# Patient Record
Sex: FEMALE | Race: WHITE | Employment: OTHER | URBAN - METROPOLITAN AREA
[De-identification: names, ages, dates, MRNs, and addresses within clinical notes are randomized per-mention and may not be internally consistent; named-entity substitution may affect disease eponyms.]

---

## 2021-06-24 ENCOUNTER — THERAPY VISIT (OUTPATIENT)
Dept: PHYSICAL THERAPY | Facility: CLINIC | Age: 71
End: 2021-06-24
Payer: MEDICARE

## 2021-06-24 DIAGNOSIS — M54.50 LEFT-SIDED LOW BACK PAIN WITHOUT SCIATICA: ICD-10-CM

## 2021-06-24 DIAGNOSIS — M79.18 RIGHT BUTTOCK PAIN: Primary | ICD-10-CM

## 2021-06-24 PROCEDURE — 97161 PT EVAL LOW COMPLEX 20 MIN: CPT | Mod: GP | Performed by: PHYSICAL THERAPIST

## 2021-06-24 PROCEDURE — 97110 THERAPEUTIC EXERCISES: CPT | Mod: GP | Performed by: PHYSICAL THERAPIST

## 2021-06-24 PROCEDURE — 97530 THERAPEUTIC ACTIVITIES: CPT | Mod: GP | Performed by: PHYSICAL THERAPIST

## 2021-06-24 ASSESSMENT — ACTIVITIES OF DAILY LIVING (ADL)
HOS_ADL_COUNT: 17
LIGHT_TO_MODERATE_WORK: NO DIFFICULTY AT ALL
WALKING_UP_STEEP_HILLS: MODERATE DIFFICULTY
STEPPING_UP_AND_DOWN_CURBS: NO DIFFICULTY AT ALL
ROLLING_OVER_IN_BED: NO DIFFICULTY AT ALL
STANDING_FOR_15_MINUTES: NO DIFFICULTY AT ALL
TWISTING/PIVOTING_ON_INVOLVED_LEG: NO DIFFICULTY AT ALL
SITTING_FOR_15_MINUTES: MODERATE DIFFICULTY
GETTING_INTO_AND_OUT_OF_A_BATHTUB: NO DIFFICULTY AT ALL
GETTING_INTO_AND_OUT_OF_AN_AVERAGE_CAR: NO DIFFICULTY AT ALL
HEAVY_WORK: SLIGHT DIFFICULTY
HOW_WOULD_YOU_RATE_YOUR_CURRENT_LEVEL_OF_FUNCTION_DURING_YOUR_USUAL_ACTIVITIES_OF_DAILY_LIVING_FROM_0_TO_100_WITH_100_BEING_YOUR_LEVEL_OF_FUNCTION_PRIOR_TO_YOUR_HIP_PROBLEM_AND_0_BEING_THE_INABILITY_TO_PERFORM_ANY_OF_YOUR_USUAL_DAILY_ACTIVITIES?: 100
GOING_UP_1_FLIGHT_OF_STAIRS: NO DIFFICULTY AT ALL
HOS_ADL_SCORE(%): 85.29
WALKING_INITIALLY: SLIGHT DIFFICULTY
DEEP_SQUATTING: MODERATE DIFFICULTY
WALKING_15_MINUTES_OR_GREATER: NO DIFFICULTY AT ALL
PUTTING_ON_SOCKS_AND_SHOES: SLIGHT DIFFICULTY
WALKING_APPROXIMATELY_10_MINUTES: NO DIFFICULTY AT ALL
GOING_DOWN_1_FLIGHT_OF_STAIRS: NO DIFFICULTY AT ALL
HOS_ADL_ITEM_SCORE_TOTAL: 58
HOS_ADL_HIGHEST_POTENTIAL_SCORE: 68
RECREATIONAL_ACTIVITIES: MODERATE DIFFICULTY
WALKING_DOWN_STEEP_HILLS: MODERATE DIFFICULTY

## 2021-06-24 NOTE — PROGRESS NOTES
Physical Therapy Initial Evaluation  Subjective:  Onset of R hamstring insertion pain over the past year after started hiking more throughout Covid.Referred to PT 03/2021. 5-6 miles of trail hiking at pretty fast pace, usually 2x week. Pain is worse with sitting/driving (especially long distances like to [place]), the next day after hiking, worse with hamstring stretch, stopped yoga b/c was hurting. Also reports some L SI pain ~ 1 year. Denies any B leg pain past buttock. Goal is to get rid of pain, possibly get back to running, hiking for sure, driving long distances.  Pain currently 2/10, was at 5/10 prior to stopping stretching. Has had recent PT but decided to get another opinion.     The history is provided by the patient.   Patient Health History         Pain is reported as 2/10 on pain scale.  General health as reported by patient is excellent.  Pertinent medical history includes: cancer, depression, menopausal and osteoarthritis.      Other medical allergies details: Darvocet.   Surgeries include:  Cancer surgery and other. Other surgery history details: malignant melanoma, intestinal blockage.    Current medications:  Anti-depressants.    Current occupation is retired.                                       Objective:  Standing Alignment:        Lumbar:  Lordosis decr                           Lumbar/SI Evaluation  ROM:    AROM Lumbar:   Flexion:        WNL, pull at R hamstring origin/Isch tube  Ext:                    Min loss   Side Bend:        Left:     Right:   Rotation:           Left:     Right:   Side Glide:        Left:     Right:                   Neural Tension/Mobility:      Left side:SLR; SLR w/DF or Slump  negative.   Right side:   SLR positive.  Right side:   SLR w/DF or Slump  negative.                                             Hip Evaluation  Hip PROM:  Hip PROM:  Left Hip:    Normal  Right Hip:  Normal                          Hip Strength:  : + 90/90 resisted R hamstring test for pain and  weakness. Prone testing strong and painfree B.         Abduction:  Left: 4+/5    -   Pain:Right: 3+/5   -   Pain:                  Hip Special Testing:      Left hip negative for the following special tests:  Piriformis; Louis or Fadir/Labrum   Right hip positive for the following special tests:  PiriformisRight hip negative for the following special tests:  Louis or Fadir/Labrum    Hip Palpation:      Left hip tenderness not present at:  Ischial Tuberosity  Right hip tenderness present at:  Ischial Tuberosity  Functional Testing:        Core Strength:      Single leg bridge:   Left: fair to good control with SL weight shift/20 reps  Right: poor to fair R pelvis control with bridge weight shift. /20 reps  % of Uninvolved:                    General     ROS    Assessment/Plan:    Patient is a 71 year old female with lumbar and R buttock complaints.    Patient has the following significant findings with corresponding treatment plan.                Diagnosis 1:  R hamstring tendinopathy  Pain -  hot/cold therapy, US, manual therapy, splint/taping/bracing/orthotics, self management, education, directional preference exercise and home program  Decreased ROM/flexibility - manual therapy and therapeutic exercise  Decreased strength - therapeutic exercise and therapeutic activities  Decreased proprioception - neuro re-education and therapeutic activities  Inflammation - self management/home program  Impaired gait - gait training  Impaired muscle performance - neuro re-education  Decreased function - therapeutic activities  Impaired posture - neuro re-education  Diagnosis 2:  L SI pain   Pain -  hot/cold therapy, manual therapy, splint/taping/bracing/orthotics, self management, education, directional preference exercise and home program  Decreased ROM/flexibility - manual therapy and therapeutic exercise  Decreased strength - therapeutic exercise and therapeutic activities  Decreased proprioception - neuro re-education and  therapeutic activities  Inflammation - self management/home program  Impaired gait - gait training  Impaired muscle performance - neuro re-education  Decreased function - therapeutic activities  Impaired posture - neuro re-education    Therapy Evaluation Codes:   1) History comprised of:   Personal factors that impact the plan of care:      None.    Comorbidity factors that impact the plan of care are:      Depression.     Medications impacting care: Anti-depressant.  2) Examination of Body Systems comprised of:   Body structures and functions that impact the plan of care:      Hip, Lumbar spine and Sacral illiac joint.   Activity limitations that impact the plan of care are:      Driving, Sitting and Walking.  3) Clinical presentation characteristics are:   Stable/Uncomplicated.  4) Decision-Making    Low complexity using standardized patient assessment instrument and/or measureable assessment of functional outcome.  Cumulative Therapy Evaluation is: Low complexity.    Previous and current functional limitations:  (See Goal Flow Sheet for this information)    Short term and Long term goals: (See Goal Flow Sheet for this information)     Communication ability:  Patient appears to be able to clearly communicate and understand verbal and written communication and follow directions correctly.  Treatment Explanation - The following has been discussed with the patient:   RX ordered/plan of care  Anticipated outcomes  Possible risks and side effects  This patient would benefit from PT intervention to resume normal activities.   Rehab potential is good.    Frequency:  1 X week, once daily  Duration:  for 8 weeks  Discharge Plan:  Achieve all LTG.  Independent in home treatment program.  Reach maximal therapeutic benefit.    Please refer to the daily flowsheet for treatment today, total treatment time and time spent performing 1:1 timed codes.

## 2021-06-24 NOTE — LETTER
DEPARTMENT OF HEALTH AND HUMAN SERVICES  [facility]    PLAN/UPDATED PLAN OF PROGRESS FOR OUTPATIENT REHABILITATION          PATIENTS NAME:  Maria Esther Miller   : 1950  PROVIDER NUMBER:    [id number]  HICN:  [id number]  PROVIDER NAME: [facility]  MEDICAL RECORD NUMBER: [id number]   START OF CARE DATE: 21   TYPE:  PT  PRIMARY/TREATMENT DIAGNOSIS:    Right buttock pain  Left-sided low back pain without sciatica    VISITS FROM START OF CARE:  Rxs Used: 1     Physical Therapy Initial Evaluation  Subjective:  Onset of R hamstring insertion pain over the past year after started hiking more throughout Covid.Referred to PT 2021. 5-6 miles of trail hiking at pretty fast pace, usually 2x week. Pain is worse with sitting/driving (especially long distances like to [place]), the next day after hiking, worse with hamstring stretch, stopped yoga b/c was hurting. Also reports some L SI pain ~ 1 year. Denies any B leg pain past buttock. Goal is to get rid of pain, possibly get back to running, hiking for sure, driving long distances.  Pain currently 2/10, was at 5/10 prior to stopping stretching. Has had recent PT but decided to get another opinion.     The history is provided by the patient.   Patient Health History     Pain is reported as 2/10 on pain scale.  General health as reported by patient is excellent.  Pertinent medical history includes: cancer, depression, menopausal and osteoarthritis.      Other medical allergies details: Darvocet.   Surgeries include:  Cancer surgery and other. Other surgery history details: malignant melanoma, intestinal blockage.    Current medications:  Anti-depressants.    Current occupation is retired.     Objective:  Standing Alignment:        Lumbar:  Lordosis decr            PATIENTS NAME:  Maria Esther Miller   : 1950       Lumbar/SI Evaluation  ROM:    AROM Lumbar:   Flexion:        WNL, pull at R hamstring  origin/Isch tube  Ext:                    Min loss   Side Bend:        Left:     Right:   Rotation:           Left:     Right:   Side Glide:        Left:     Right:         Neural Tension/Mobility:      Left side:SLR; SLR w/DF or Slump  negative.   Right side:   SLR positive.  Right side:   SLR w/DF or Slump  negative.           Hip Evaluation  Hip PROM:  Hip PROM:  Left Hip:    Normal  Right Hip:  Normal      Hip Strength:  : + 90/90 resisted R hamstring test for pain and weakness. Prone testing strong and painfree B.     Abduction:  Left: 4+/5    -   Pain:Right: 3+/5   -   Pain:      Hip Special Testing:      Left hip negative for the following special tests:  Piriformis; Louis or Fadir/Labrum   Right hip positive for the following special tests:  PiriformisRight hip negative for the following special tests:  Louis or Fadir/Labrum    Hip Palpation:      Left hip tenderness not present at:  Ischial Tuberosity  Right hip tenderness present at:  Ischial Tuberosity  Functional Testing:        Core Strength:      Single leg bridge:   Left: fair to good control with SL weight shift/20 reps  Right: poor to fair R pelvis control with bridge weight shift. /20 reps  % of Uninvolved:     Assessment/Plan:    Patient is a 71 year old female with lumbar and R buttock complaints.    Patient has the following significant findings with corresponding treatment plan.                Diagnosis 1:  R hamstring tendinopathy  Pain -  hot/cold therapy, US, manual therapy, splint/taping/bracing/orthotics, self management, education, directional preference exercise and home program  PATIENTS NAME:  Paul Maria Esther   : 1950    Decreased ROM/flexibility - manual therapy and therapeutic exercise  Decreased strength - therapeutic exercise and therapeutic activities  Decreased proprioception - neuro re-education and therapeutic activities  Inflammation - self management/home program  Impaired gait - gait training  Impaired muscle performance  - neuro re-education  Decreased function - therapeutic activities  Impaired posture - neuro re-education  Diagnosis 2:  L SI pain   Pain -  hot/cold therapy, manual therapy, splint/taping/bracing/orthotics, self management, education, directional preference exercise and home program  Decreased ROM/flexibility - manual therapy and therapeutic exercise  Decreased strength - therapeutic exercise and therapeutic activities  Decreased proprioception - neuro re-education and therapeutic activities  Inflammation - self management/home program  Impaired gait - gait training  Impaired muscle performance - neuro re-education  Decreased function - therapeutic activities  Impaired posture - neuro re-education    Therapy Evaluation Codes:   1) History comprised of:   Personal factors that impact the plan of care:      None.    Comorbidity factors that impact the plan of care are:      Depression.     Medications impacting care: Anti-depressant.  2) Examination of Body Systems comprised of:   Body structures and functions that impact the plan of care:      Hip, Lumbar spine and Sacral illiac joint.   Activity limitations that impact the plan of care are:      Driving, Sitting and Walking.  3) Clinical presentation characteristics are:   Stable/Uncomplicated.  4) Decision-Making    Low complexity using standardized patient assessment instrument and/or measureable assessment of functional outcome.  Cumulative Therapy Evaluation is: Low complexity.    Previous and current functional limitations:  (See Goal Flow Sheet for this information)    Short term and Long term goals: (See Goal Flow Sheet for this information)     Communication ability:  Patient appears to be able to clearly communicate and understand verbal and written communication and follow directions correctly.  Treatment Explanation - The following has been discussed with the patient:   RX ordered/plan of care  Anticipated outcomes  Possible risks and side effects  This  "patient would benefit from PT intervention to resume normal activities.   Rehab potential is good.    PATIENTS NAME:  Maria Esther Miller   : 1950    Frequency:  1 X week, once daily  Duration:  for 8 weeks  Discharge Plan:  Achieve all LTG.  Independent in home treatment program.  Reach maximal therapeutic benefit.    Caregiver Signature/Credentials _____________________________ Date ________      Treating Provider: Cherelle Hamilton, PT, OCS     I have reviewed and certified the need for these services and plan of treatment while under my care.        PHYSICIAN'S SIGNATURE:   _____________________________________  Date___________                                 Lincoln Modi MD    Certification period:  Beginning of Cert date period: 21 to  End of Cert period date: 21     Functional Level Progress Report: Please see attached \"Goal Flow sheet for Functional level.\"    ____X____ Continue Services or       ________ DC Services                Service dates: From  SOC Date: 21 date to present                         "

## 2021-07-01 ENCOUNTER — THERAPY VISIT (OUTPATIENT)
Dept: PHYSICAL THERAPY | Facility: CLINIC | Age: 71
End: 2021-07-01
Payer: MEDICARE

## 2021-07-01 DIAGNOSIS — M79.18 RIGHT BUTTOCK PAIN: ICD-10-CM

## 2021-07-01 DIAGNOSIS — M54.50 ACUTE LEFT-SIDED LOW BACK PAIN WITHOUT SCIATICA: ICD-10-CM

## 2021-07-01 PROCEDURE — 97112 NEUROMUSCULAR REEDUCATION: CPT | Mod: GP | Performed by: PHYSICAL THERAPY ASSISTANT

## 2021-07-01 PROCEDURE — 97110 THERAPEUTIC EXERCISES: CPT | Mod: GP | Performed by: PHYSICAL THERAPY ASSISTANT

## 2021-07-01 PROCEDURE — 97140 MANUAL THERAPY 1/> REGIONS: CPT | Mod: GP | Performed by: PHYSICAL THERAPY ASSISTANT

## 2021-07-19 ENCOUNTER — THERAPY VISIT (OUTPATIENT)
Dept: PHYSICAL THERAPY | Facility: CLINIC | Age: 71
End: 2021-07-19
Payer: MEDICARE

## 2021-07-19 DIAGNOSIS — M79.18 RIGHT BUTTOCK PAIN: ICD-10-CM

## 2021-07-19 DIAGNOSIS — M54.50 ACUTE LEFT-SIDED LOW BACK PAIN WITHOUT SCIATICA: ICD-10-CM

## 2021-07-19 PROCEDURE — 97140 MANUAL THERAPY 1/> REGIONS: CPT | Mod: GP | Performed by: PHYSICAL THERAPY ASSISTANT

## 2021-07-19 PROCEDURE — 97110 THERAPEUTIC EXERCISES: CPT | Mod: GP | Performed by: PHYSICAL THERAPY ASSISTANT

## 2021-07-26 ENCOUNTER — THERAPY VISIT (OUTPATIENT)
Dept: PHYSICAL THERAPY | Facility: CLINIC | Age: 71
End: 2021-07-26
Payer: MEDICARE

## 2021-07-26 DIAGNOSIS — M54.50 ACUTE LEFT-SIDED LOW BACK PAIN WITHOUT SCIATICA: ICD-10-CM

## 2021-07-26 DIAGNOSIS — M79.18 RIGHT BUTTOCK PAIN: ICD-10-CM

## 2021-07-26 PROCEDURE — 97110 THERAPEUTIC EXERCISES: CPT | Mod: GP | Performed by: PHYSICAL THERAPY ASSISTANT

## 2021-07-26 PROCEDURE — 97140 MANUAL THERAPY 1/> REGIONS: CPT | Mod: GP | Performed by: PHYSICAL THERAPY ASSISTANT

## 2021-08-09 ENCOUNTER — THERAPY VISIT (OUTPATIENT)
Dept: PHYSICAL THERAPY | Facility: CLINIC | Age: 71
End: 2021-08-09
Payer: MEDICARE

## 2021-08-09 DIAGNOSIS — M54.50 ACUTE LEFT-SIDED LOW BACK PAIN WITHOUT SCIATICA: ICD-10-CM

## 2021-08-09 DIAGNOSIS — M79.18 RIGHT BUTTOCK PAIN: ICD-10-CM

## 2021-08-09 PROCEDURE — 97530 THERAPEUTIC ACTIVITIES: CPT | Mod: GP | Performed by: PHYSICAL THERAPIST

## 2021-08-09 PROCEDURE — 97140 MANUAL THERAPY 1/> REGIONS: CPT | Mod: GP | Performed by: PHYSICAL THERAPIST

## 2021-09-20 ENCOUNTER — THERAPY VISIT (OUTPATIENT)
Dept: PHYSICAL THERAPY | Facility: CLINIC | Age: 71
End: 2021-09-20
Payer: MEDICARE

## 2021-09-20 DIAGNOSIS — M54.50 ACUTE LEFT-SIDED LOW BACK PAIN WITHOUT SCIATICA: ICD-10-CM

## 2021-09-20 DIAGNOSIS — M79.18 RIGHT BUTTOCK PAIN: ICD-10-CM

## 2021-09-20 PROCEDURE — 97530 THERAPEUTIC ACTIVITIES: CPT | Mod: GP | Performed by: PHYSICAL THERAPY ASSISTANT

## 2021-09-20 PROCEDURE — 97110 THERAPEUTIC EXERCISES: CPT | Mod: GP | Performed by: PHYSICAL THERAPY ASSISTANT

## 2021-09-20 ASSESSMENT — ACTIVITIES OF DAILY LIVING (ADL)
HOS_ADL_HIGHEST_POTENTIAL_SCORE: 68
WALKING_UP_STEEP_HILLS: MODERATE DIFFICULTY
GOING_UP_1_FLIGHT_OF_STAIRS: NO DIFFICULTY AT ALL
WALKING_15_MINUTES_OR_GREATER: NO DIFFICULTY AT ALL
HOS_ADL_COUNT: 17
LIGHT_TO_MODERATE_WORK: NO DIFFICULTY AT ALL
STEPPING_UP_AND_DOWN_CURBS: NO DIFFICULTY AT ALL
PUTTING_ON_SOCKS_AND_SHOES: MODERATE DIFFICULTY
SITTING_FOR_15_MINUTES: MODERATE DIFFICULTY
GETTING_INTO_AND_OUT_OF_A_BATHTUB: NO DIFFICULTY AT ALL
RECREATIONAL_ACTIVITIES: SLIGHT DIFFICULTY
HOS_ADL_SCORE(%): 83.82
WALKING_APPROXIMATELY_10_MINUTES: NO DIFFICULTY AT ALL
GETTING_INTO_AND_OUT_OF_AN_AVERAGE_CAR: SLIGHT DIFFICULTY
STANDING_FOR_15_MINUTES: NO DIFFICULTY AT ALL
WALKING_DOWN_STEEP_HILLS: MODERATE DIFFICULTY
WALKING_INITIALLY: MODERATE DIFFICULTY
TWISTING/PIVOTING_ON_INVOLVED_LEG: SLIGHT DIFFICULTY
DEEP_SQUATTING: NO DIFFICULTY AT ALL
HEAVY_WORK: MODERATE DIFFICULTY
HOS_ADL_ITEM_SCORE_TOTAL: 57
GOING_DOWN_1_FLIGHT_OF_STAIRS: NO DIFFICULTY AT ALL
ROLLING_OVER_IN_BED: NO DIFFICULTY AT ALL

## 2021-09-20 NOTE — LETTER
[facility]  [address]  [address]  [place] [address]  [phone]    2021    Re: Maria Esther Miller   :   1950  MRN:  [id number]   REFERRING PHYSICIAN:   Lincoln [facility]MONA [facility]    Date of Initial Evaluation:  2021  Visits:  Rxs Used: 6  Reason for Referral:     Right buttock pain  Acute left-sided low back pain without sciatica    Assessment/Plan:    DISCHARGE REPORT    Progress reporting period is from 2021 to 2021.      SUBJECTIVE  Subjective changes noted by patient:  Patient reported that she drove 275 miles and she felt good.  Patient feels that she is about 90% back to normal.  Patient stated that she has had pain in the right hip for 1 year..  Improved and feels that she can go back to her hiking 3 days a week.     Current pain level is  2/10    Previous pain level was:       Changes in function:  Yes (See Goal flowsheet attached for changes in current functional level)     Adverse reaction to treatment or activity: None     OBJECTIVE  Changes noted in objective findings:  Yes, patient has a good home exercise program and discussed ongoing x3 months.  Discussed also with her her exercise and hiking program and to continue as tolerated.  Discussed with patient possibly not hiking 6-7 times a week but may be 3-4 times a week.  Manual muscle test of the right lower extremity was a 5/5.        Assessment/Plan:    ASSESSMENT/PLAN  Updated problem list and treatment plan:   STG/LTGs have been met:  Yes (See Goal flow sheet completed today.)  Progress toward STG/LTGs have been made:  Yes (See Goal flow sheet completed today.)  Assessment of Progress: The patient's condition is improving.  Self Management Plans:  Patient is independent in a home treatment program.  Patient is independent in self management of symptoms.    Maria Esther continues to require the following intervention to meet  STG and LT's:  PT intervention is no longer required to meet STG/LTG.    Recommendations:  This patient is ready to be discharged from therapy and continue their home treatment program.        Re: Maria Esther Miller   :   1950      Thank you for your referral.    INQUIRIES  Therapist: Cherelle Hamilton, MS, PT, [address] [address]  Phone: [phone]  Fax: [phone]

## 2021-09-24 NOTE — PROGRESS NOTES
Subjective:  HPI  Physical Exam                    Objective:  System    Physical Exam    General     ROS    Assessment/Plan:    DISCHARGE REPORT    Progress reporting period is from 6/24/2021 to 9/20/2021.      SUBJECTIVE  Subjective changes noted by patient:  Patient reported that she drove 275 miles and she felt good.  Patient feels that she is about 90% back to normal.  Patient stated that she has had pain in the right hip for 1 year..  Improved and feels that she can go back to her hiking 3 days a week.     Current pain level is  2/10    Previous pain level was:       Changes in function:  Yes (See Goal flowsheet attached for changes in current functional level)     Adverse reaction to treatment or activity: None     OBJECTIVE  Changes noted in objective findings:  Yes, patient has a good home exercise program and discussed ongoing x3 months.  Discussed also with her her exercise and hiking program and to continue as tolerated.  Discussed with patient possibly not hiking 6-7 times a week but may be 3-4 times a week.  Manual muscle test of the right lower extremity was a 5/5.

## 2021-09-27 PROBLEM — M79.18 RIGHT BUTTOCK PAIN: Status: RESOLVED | Noted: 2021-06-24 | Resolved: 2021-09-27

## 2021-09-27 PROBLEM — M54.50 LEFT-SIDED LOW BACK PAIN WITHOUT SCIATICA: Status: RESOLVED | Noted: 2021-06-24 | Resolved: 2021-09-27

## 2021-09-27 NOTE — PROGRESS NOTES
Subjective:  HPI  Physical Exam                    Objective:  System    Physical Exam    General     ROS    Assessment/Plan:    ASSESSMENT/PLAN  Updated problem list and treatment plan:   STG/LTGs have been met:  Yes (See Goal flow sheet completed today.)  Progress toward STG/LTGs have been made:  Yes (See Goal flow sheet completed today.)  Assessment of Progress: The patient's condition is improving.  Self Management Plans:  Patient is independent in a home treatment program.  Patient is independent in self management of symptoms.    Maria Esther continues to require the following intervention to meet STG and LT's:  PT intervention is no longer required to meet STG/LTG.    Recommendations:  This patient is ready to be discharged from therapy and continue their home treatment program.    Please refer to the daily flowsheet for treatment today, total treatment time and time spent performing 1:1 timed codes.

## 2024-10-24 ENCOUNTER — APPOINTMENT (OUTPATIENT)
Dept: GENERAL RADIOLOGY | Facility: CLINIC | Age: 74
DRG: 336 | End: 2024-10-24
Attending: STUDENT IN AN ORGANIZED HEALTH CARE EDUCATION/TRAINING PROGRAM
Payer: COMMERCIAL

## 2024-10-24 ENCOUNTER — APPOINTMENT (OUTPATIENT)
Dept: CT IMAGING | Facility: CLINIC | Age: 74
DRG: 336 | End: 2024-10-24
Attending: STUDENT IN AN ORGANIZED HEALTH CARE EDUCATION/TRAINING PROGRAM
Payer: COMMERCIAL

## 2024-10-24 ENCOUNTER — HOSPITAL ENCOUNTER (INPATIENT)
Facility: CLINIC | Age: 74
LOS: 9 days | Discharge: HOME | DRG: 336 | End: 2024-11-02
Attending: STUDENT IN AN ORGANIZED HEALTH CARE EDUCATION/TRAINING PROGRAM | Admitting: STUDENT IN AN ORGANIZED HEALTH CARE EDUCATION/TRAINING PROGRAM
Payer: COMMERCIAL

## 2024-10-24 DIAGNOSIS — K56.609 SMALL BOWEL OBSTRUCTION (H): ICD-10-CM

## 2024-10-24 LAB
ALBUMIN SERPL BCG-MCNC: 4.7 G/DL (ref 3.5–5.2)
ALP SERPL-CCNC: 80 U/L (ref 40–150)
ALT SERPL W P-5'-P-CCNC: 27 U/L (ref 0–50)
ANION GAP SERPL CALCULATED.3IONS-SCNC: 18 MMOL/L (ref 7–15)
AST SERPL W P-5'-P-CCNC: 43 U/L (ref 0–45)
B-OH-BUTYR SERPL-SCNC: 0.83 MMOL/L
BASOPHILS # BLD AUTO: 0 10E3/UL (ref 0–0.2)
BASOPHILS NFR BLD AUTO: 0 %
BILIRUB SERPL-MCNC: 0.4 MG/DL
BUN SERPL-MCNC: 14.3 MG/DL (ref 8–23)
CALCIUM SERPL-MCNC: 9.8 MG/DL (ref 8.8–10.4)
CHLORIDE SERPL-SCNC: 99 MMOL/L (ref 98–107)
CREAT SERPL-MCNC: 0.82 MG/DL (ref 0.51–0.95)
EGFRCR SERPLBLD CKD-EPI 2021: 76 ML/MIN/1.73M2
EOSINOPHIL # BLD AUTO: 0 10E3/UL (ref 0–0.7)
EOSINOPHIL NFR BLD AUTO: 0 %
ERYTHROCYTE [DISTWIDTH] IN BLOOD BY AUTOMATED COUNT: 12.5 % (ref 10–15)
GLUCOSE SERPL-MCNC: 120 MG/DL (ref 70–99)
HCO3 SERPL-SCNC: 21 MMOL/L (ref 22–29)
HCT VFR BLD AUTO: 43.6 % (ref 35–47)
HGB BLD-MCNC: 15.2 G/DL (ref 11.7–15.7)
HOLD SPECIMEN: NORMAL
HOLD SPECIMEN: NORMAL
IMM GRANULOCYTES # BLD: 0 10E3/UL
IMM GRANULOCYTES NFR BLD: 0 %
LACTATE SERPL-SCNC: 0.9 MMOL/L (ref 0.7–2)
LIPASE SERPL-CCNC: 26 U/L (ref 13–60)
LYMPHOCYTES # BLD AUTO: 0.9 10E3/UL (ref 0.8–5.3)
LYMPHOCYTES NFR BLD AUTO: 8 %
MCH RBC QN AUTO: 32.3 PG (ref 26.5–33)
MCHC RBC AUTO-ENTMCNC: 34.9 G/DL (ref 31.5–36.5)
MCV RBC AUTO: 93 FL (ref 78–100)
MONOCYTES # BLD AUTO: 0.9 10E3/UL (ref 0–1.3)
MONOCYTES NFR BLD AUTO: 8 %
NEUTROPHILS # BLD AUTO: 9.5 10E3/UL (ref 1.6–8.3)
NEUTROPHILS NFR BLD AUTO: 84 %
NRBC # BLD AUTO: 0 10E3/UL
NRBC BLD AUTO-RTO: 0 /100
PLATELET # BLD AUTO: 298 10E3/UL (ref 150–450)
POTASSIUM SERPL-SCNC: 4.3 MMOL/L (ref 3.4–5.3)
PROT SERPL-MCNC: 7.6 G/DL (ref 6.4–8.3)
RBC # BLD AUTO: 4.71 10E6/UL (ref 3.8–5.2)
SODIUM SERPL-SCNC: 138 MMOL/L (ref 135–145)
WBC # BLD AUTO: 11.4 10E3/UL (ref 4–11)

## 2024-10-24 PROCEDURE — 99223 1ST HOSP IP/OBS HIGH 75: CPT | Performed by: STUDENT IN AN ORGANIZED HEALTH CARE EDUCATION/TRAINING PROGRAM

## 2024-10-24 PROCEDURE — 96365 THER/PROPH/DIAG IV INF INIT: CPT | Mod: 59

## 2024-10-24 PROCEDURE — 255N000002 HC RX 255 OP 636: Performed by: SURGERY

## 2024-10-24 PROCEDURE — 99285 EMERGENCY DEPT VISIT HI MDM: CPT | Mod: 25

## 2024-10-24 PROCEDURE — 80053 COMPREHEN METABOLIC PANEL: CPT | Performed by: STUDENT IN AN ORGANIZED HEALTH CARE EDUCATION/TRAINING PROGRAM

## 2024-10-24 PROCEDURE — 36415 COLL VENOUS BLD VENIPUNCTURE: CPT | Performed by: STUDENT IN AN ORGANIZED HEALTH CARE EDUCATION/TRAINING PROGRAM

## 2024-10-24 PROCEDURE — 258N000003 HC RX IP 258 OP 636: Performed by: STUDENT IN AN ORGANIZED HEALTH CARE EDUCATION/TRAINING PROGRAM

## 2024-10-24 PROCEDURE — 120N000001 HC R&B MED SURG/OB

## 2024-10-24 PROCEDURE — 250N000011 HC RX IP 250 OP 636: Performed by: STUDENT IN AN ORGANIZED HEALTH CARE EDUCATION/TRAINING PROGRAM

## 2024-10-24 PROCEDURE — 83690 ASSAY OF LIPASE: CPT | Performed by: STUDENT IN AN ORGANIZED HEALTH CARE EDUCATION/TRAINING PROGRAM

## 2024-10-24 PROCEDURE — 87040 BLOOD CULTURE FOR BACTERIA: CPT | Performed by: STUDENT IN AN ORGANIZED HEALTH CARE EDUCATION/TRAINING PROGRAM

## 2024-10-24 PROCEDURE — 82010 KETONE BODYS QUAN: CPT | Performed by: STUDENT IN AN ORGANIZED HEALTH CARE EDUCATION/TRAINING PROGRAM

## 2024-10-24 PROCEDURE — 250N000009 HC RX 250: Performed by: STUDENT IN AN ORGANIZED HEALTH CARE EDUCATION/TRAINING PROGRAM

## 2024-10-24 PROCEDURE — 83605 ASSAY OF LACTIC ACID: CPT | Performed by: STUDENT IN AN ORGANIZED HEALTH CARE EDUCATION/TRAINING PROGRAM

## 2024-10-24 PROCEDURE — 999N000065 XR ABDOMEN 1 VIEW

## 2024-10-24 PROCEDURE — 99221 1ST HOSP IP/OBS SF/LOW 40: CPT | Performed by: SURGERY

## 2024-10-24 PROCEDURE — 96375 TX/PRO/DX INJ NEW DRUG ADDON: CPT

## 2024-10-24 PROCEDURE — 250N000009 HC RX 250: Performed by: SURGERY

## 2024-10-24 PROCEDURE — 85025 COMPLETE CBC W/AUTO DIFF WBC: CPT | Performed by: STUDENT IN AN ORGANIZED HEALTH CARE EDUCATION/TRAINING PROGRAM

## 2024-10-24 PROCEDURE — 74177 CT ABD & PELVIS W/CONTRAST: CPT

## 2024-10-24 RX ORDER — IOPAMIDOL 755 MG/ML
500 INJECTION, SOLUTION INTRAVASCULAR ONCE
Status: COMPLETED | OUTPATIENT
Start: 2024-10-24 | End: 2024-10-24

## 2024-10-24 RX ORDER — PROCHLORPERAZINE MALEATE 5 MG/1
5 TABLET ORAL EVERY 6 HOURS PRN
Status: DISCONTINUED | OUTPATIENT
Start: 2024-10-24 | End: 2024-11-02 | Stop reason: HOSPADM

## 2024-10-24 RX ORDER — DEXTROSE MONOHYDRATE, SODIUM CHLORIDE, SODIUM LACTATE, POTASSIUM CHLORIDE, CALCIUM CHLORIDE 5; 600; 310; 179; 20 G/100ML; MG/100ML; MG/100ML; MG/100ML; MG/100ML
INJECTION, SOLUTION INTRAVENOUS CONTINUOUS
Status: DISCONTINUED | OUTPATIENT
Start: 2024-10-24 | End: 2024-10-28

## 2024-10-24 RX ORDER — ONDANSETRON 4 MG/1
4 TABLET, ORALLY DISINTEGRATING ORAL EVERY 6 HOURS PRN
Status: DISCONTINUED | OUTPATIENT
Start: 2024-10-24 | End: 2024-11-02 | Stop reason: HOSPADM

## 2024-10-24 RX ORDER — SODIUM CHLORIDE, SODIUM LACTATE, POTASSIUM CHLORIDE, CALCIUM CHLORIDE 600; 310; 30; 20 MG/100ML; MG/100ML; MG/100ML; MG/100ML
INJECTION, SOLUTION INTRAVENOUS CONTINUOUS
Status: DISCONTINUED | OUTPATIENT
Start: 2024-10-24 | End: 2024-10-24

## 2024-10-24 RX ORDER — FENTANYL CITRATE 50 UG/ML
50 INJECTION, SOLUTION INTRAMUSCULAR; INTRAVENOUS ONCE
Status: COMPLETED | OUTPATIENT
Start: 2024-10-24 | End: 2024-10-24

## 2024-10-24 RX ORDER — LIDOCAINE 40 MG/G
CREAM TOPICAL
Status: DISCONTINUED | OUTPATIENT
Start: 2024-10-24 | End: 2024-11-02 | Stop reason: HOSPADM

## 2024-10-24 RX ORDER — AMOXICILLIN 250 MG
2 CAPSULE ORAL 2 TIMES DAILY PRN
Status: DISCONTINUED | OUTPATIENT
Start: 2024-10-24 | End: 2024-10-28

## 2024-10-24 RX ORDER — ACETAMINOPHEN 325 MG/1
650 TABLET ORAL EVERY 4 HOURS PRN
Status: DISCONTINUED | OUTPATIENT
Start: 2024-10-24 | End: 2024-11-02 | Stop reason: HOSPADM

## 2024-10-24 RX ORDER — LORAZEPAM 0.5 MG/1
0.5 TABLET ORAL 2 TIMES DAILY PRN
Status: DISCONTINUED | OUTPATIENT
Start: 2024-10-24 | End: 2024-11-02 | Stop reason: HOSPADM

## 2024-10-24 RX ORDER — HYDROMORPHONE HCL IN WATER/PF 6 MG/30 ML
0.2 PATIENT CONTROLLED ANALGESIA SYRINGE INTRAVENOUS
Status: DISCONTINUED | OUTPATIENT
Start: 2024-10-24 | End: 2024-11-02 | Stop reason: HOSPADM

## 2024-10-24 RX ORDER — HYDROMORPHONE HCL IN WATER/PF 6 MG/30 ML
0.4 PATIENT CONTROLLED ANALGESIA SYRINGE INTRAVENOUS
Status: DISCONTINUED | OUTPATIENT
Start: 2024-10-24 | End: 2024-11-02 | Stop reason: HOSPADM

## 2024-10-24 RX ORDER — LORAZEPAM 2 MG/ML
0.5 INJECTION INTRAMUSCULAR ONCE
Status: COMPLETED | OUTPATIENT
Start: 2024-10-24 | End: 2024-10-24

## 2024-10-24 RX ORDER — KETOROLAC TROMETHAMINE 15 MG/ML
15 INJECTION, SOLUTION INTRAMUSCULAR; INTRAVENOUS ONCE
Status: COMPLETED | OUTPATIENT
Start: 2024-10-24 | End: 2024-10-24

## 2024-10-24 RX ORDER — AMOXICILLIN 250 MG
1 CAPSULE ORAL 2 TIMES DAILY PRN
Status: DISCONTINUED | OUTPATIENT
Start: 2024-10-24 | End: 2024-10-28

## 2024-10-24 RX ORDER — HYDROMORPHONE HYDROCHLORIDE 1 MG/ML
0.3 INJECTION, SOLUTION INTRAMUSCULAR; INTRAVENOUS; SUBCUTANEOUS ONCE
Status: COMPLETED | OUTPATIENT
Start: 2024-10-24 | End: 2024-10-24

## 2024-10-24 RX ORDER — BUPROPION HYDROCHLORIDE 150 MG/1
150 TABLET, EXTENDED RELEASE ORAL EVERY EVENING
Status: DISCONTINUED | OUTPATIENT
Start: 2024-10-24 | End: 2024-10-25

## 2024-10-24 RX ORDER — PROCHLORPERAZINE 25 MG
12.5 SUPPOSITORY, RECTAL RECTAL EVERY 12 HOURS PRN
Status: DISCONTINUED | OUTPATIENT
Start: 2024-10-24 | End: 2024-11-02 | Stop reason: HOSPADM

## 2024-10-24 RX ORDER — CEFTRIAXONE 2 G/1
2 INJECTION, POWDER, FOR SOLUTION INTRAMUSCULAR; INTRAVENOUS ONCE
Status: COMPLETED | OUTPATIENT
Start: 2024-10-24 | End: 2024-10-24

## 2024-10-24 RX ORDER — ACETAMINOPHEN 650 MG/1
650 SUPPOSITORY RECTAL EVERY 4 HOURS PRN
Status: DISCONTINUED | OUTPATIENT
Start: 2024-10-24 | End: 2024-11-02 | Stop reason: HOSPADM

## 2024-10-24 RX ORDER — LORAZEPAM 0.5 MG/1
0.5 TABLET ORAL AT BEDTIME
Status: DISCONTINUED | OUTPATIENT
Start: 2024-10-24 | End: 2024-11-02 | Stop reason: HOSPADM

## 2024-10-24 RX ORDER — CEFTRIAXONE 2 G/1
2 INJECTION, POWDER, FOR SOLUTION INTRAMUSCULAR; INTRAVENOUS ONCE
Status: COMPLETED | OUTPATIENT
Start: 2024-10-25 | End: 2024-10-25

## 2024-10-24 RX ORDER — GABAPENTIN 300 MG/1
900 CAPSULE ORAL AT BEDTIME
Status: DISCONTINUED | OUTPATIENT
Start: 2024-10-24 | End: 2024-11-02 | Stop reason: HOSPADM

## 2024-10-24 RX ORDER — ONDANSETRON 2 MG/ML
4 INJECTION INTRAMUSCULAR; INTRAVENOUS EVERY 6 HOURS PRN
Status: DISCONTINUED | OUTPATIENT
Start: 2024-10-24 | End: 2024-11-02 | Stop reason: HOSPADM

## 2024-10-24 RX ORDER — CALCIUM CARBONATE 500 MG/1
1000 TABLET, CHEWABLE ORAL 4 TIMES DAILY PRN
Status: DISCONTINUED | OUTPATIENT
Start: 2024-10-24 | End: 2024-11-02 | Stop reason: HOSPADM

## 2024-10-24 RX ORDER — ONDANSETRON 2 MG/ML
4 INJECTION INTRAMUSCULAR; INTRAVENOUS ONCE
Status: COMPLETED | OUTPATIENT
Start: 2024-10-24 | End: 2024-10-24

## 2024-10-24 RX ADMIN — MIDAZOLAM 0.5 MG: 1 INJECTION INTRAMUSCULAR; INTRAVENOUS at 20:07

## 2024-10-24 RX ADMIN — WATER 150 ML: 100 IRRIGANT IRRIGATION at 23:11

## 2024-10-24 RX ADMIN — FENTANYL CITRATE 50 MCG: 0.05 INJECTION, SOLUTION INTRAMUSCULAR; INTRAVENOUS at 16:51

## 2024-10-24 RX ADMIN — ONDANSETRON 4 MG: 2 INJECTION INTRAMUSCULAR; INTRAVENOUS at 19:11

## 2024-10-24 RX ADMIN — SODIUM CHLORIDE 55 ML: 9 INJECTION, SOLUTION INTRAVENOUS at 18:54

## 2024-10-24 RX ADMIN — HYDROMORPHONE HYDROCHLORIDE 0.3 MG: 1 INJECTION, SOLUTION INTRAMUSCULAR; INTRAVENOUS; SUBCUTANEOUS at 18:41

## 2024-10-24 RX ADMIN — IOPAMIDOL 59 ML: 755 INJECTION, SOLUTION INTRAVENOUS at 18:54

## 2024-10-24 RX ADMIN — LORAZEPAM 0.5 MG: 2 INJECTION INTRAMUSCULAR; INTRAVENOUS at 23:10

## 2024-10-24 RX ADMIN — CEFTRIAXONE 2 G: 2 INJECTION, POWDER, FOR SOLUTION INTRAMUSCULAR; INTRAVENOUS at 19:09

## 2024-10-24 RX ADMIN — SODIUM CHLORIDE 500 ML: 9 INJECTION, SOLUTION INTRAVENOUS at 19:09

## 2024-10-24 RX ADMIN — KETOROLAC TROMETHAMINE 15 MG: 15 INJECTION, SOLUTION INTRAMUSCULAR; INTRAVENOUS at 20:08

## 2024-10-24 ASSESSMENT — ACTIVITIES OF DAILY LIVING (ADL)
ADLS_ACUITY_SCORE: 0

## 2024-10-24 ASSESSMENT — COLUMBIA-SUICIDE SEVERITY RATING SCALE - C-SSRS
2. HAVE YOU ACTUALLY HAD ANY THOUGHTS OF KILLING YOURSELF IN THE PAST MONTH?: NO
1. IN THE PAST MONTH, HAVE YOU WISHED YOU WERE DEAD OR WISHED YOU COULD GO TO SLEEP AND NOT WAKE UP?: NO
6. HAVE YOU EVER DONE ANYTHING, STARTED TO DO ANYTHING, OR PREPARED TO DO ANYTHING TO END YOUR LIFE?: NO

## 2024-10-24 NOTE — ED PROVIDER NOTES
"  Emergency Department Note      History of Present Illness     Chief Complaint   Abdominal Pain      HPI   Maria Esther Miller is a 74 year old female with history of recurrent SBO, cecal volvulus, who presents with nausea, vomiting, diffuse abdominal pain, decreased flatulence for the past 24 hours.  Went to urgent care and had a nitrate positive UTI.  Endorses some urinary frequency.  Last SBO was over a decade ago and was felt to be related to adhesions from her hysterectomy.  She did have a bowel perforation when she was found to have cecal volvulus ultimately requiring colostomy though this was reversed.     Independent Historian   None    Review of External Notes   Urgent care note same day.  Nitrite positive UTI.  Abdominal x-ray with distended loops of bowel.    Past Medical History     Medical History and Problem List   No past medical history on file.    Medications   buPROPion (WELLBUTRIN SR) 150 MG 12 hr tablet  gabapentin (NEURONTIN) 300 MG capsule  lorazepam (ATIVAN) 0.5 MG tablet  BIOTIN PO  estradiol (ESTRACE) 1 MG tablet  MULTIPLE VITAMINS PO  Omega-3 Fatty Acids (FISH OIL) 500 MG CAPS  Vitamin E (TOCOPHEROL) 1000 UNIT capsule        Surgical History   Colostomy s/p reversal  Hysterectomy    Physical Exam     Patient Vitals for the past 24 hrs:   BP Temp Temp src Pulse Resp SpO2 Height Weight   10/25/24 0014 -- -- -- -- -- 90 % -- --   10/25/24 0000 (!) 144/72 -- -- 70 -- (!) 87 % -- --   10/24/24 2315 (!) 143/79 -- -- 65 -- 97 % -- --   10/24/24 2045 -- -- -- -- -- 97 % -- --   10/24/24 2015 138/75 -- -- 77 -- 96 % -- --   10/24/24 2000 120/69 -- -- 84 -- 99 % -- --   10/24/24 1900 (!) 116/90 -- -- -- -- -- -- --   10/24/24 1800 130/71 -- -- -- -- -- -- --   10/24/24 1745 -- -- -- -- -- 99 % -- --   10/24/24 1730 -- -- -- 80 -- 98 % -- --   10/24/24 1715 97/52 -- -- 77 -- 99 % -- --   10/24/24 1700 139/77 -- -- 76 -- 96 % -- --   10/24/24 1513 (!) 138/94 98.6  F (37  C) Temporal 87 18 97 % 1.6 m (5' 3\") " 52.7 kg (116 lb 2.9 oz)     Physical Exam  General: Awake, alert, in no acute distress   HEENT: Atraumatic   EOM normal   External ears normal   Trachea midline  Neck: Supple, normal ROM  CV: Regular rate, regular rhythm   No lower extremity edema  2+ radial and DP pulses  PULM: Breath sounds normal bilaterally  No wheezes or rales  ABD: Soft, non-tender, non-distended  Diminished bowel sounds in the left lower and left upper quadrant   No rebound or guarding   MSK: No gross deformities  NEURO: Alert, no focal deficits  Skin: Warm, dry and intact      Diagnostics     Lab Results   Labs Ordered and Resulted from Time of ED Arrival to Time of ED Departure   COMPREHENSIVE METABOLIC PANEL - Abnormal       Result Value    Sodium 138      Potassium 4.3      Carbon Dioxide (CO2) 21 (*)     Anion Gap 18 (*)     Urea Nitrogen 14.3      Creatinine 0.82      GFR Estimate 76      Calcium 9.8      Chloride 99      Glucose 120 (*)     Alkaline Phosphatase 80      AST 43      ALT 27      Protein Total 7.6      Albumin 4.7      Bilirubin Total 0.4     CBC WITH PLATELETS AND DIFFERENTIAL - Abnormal    WBC Count 11.4 (*)     RBC Count 4.71      Hemoglobin 15.2      Hematocrit 43.6      MCV 93      MCH 32.3      MCHC 34.9      RDW 12.5      Platelet Count 298      % Neutrophils 84      % Lymphocytes 8      % Monocytes 8      % Eosinophils 0      % Basophils 0      % Immature Granulocytes 0      NRBCs per 100 WBC 0      Absolute Neutrophils 9.5 (*)     Absolute Lymphocytes 0.9      Absolute Monocytes 0.9      Absolute Eosinophils 0.0      Absolute Basophils 0.0      Absolute Immature Granulocytes 0.0      Absolute NRBCs 0.0     KETONE BETA-HYDROXYBUTYRATE QUANTITATIVE, RAPID - Abnormal    Ketone (Beta-Hydroxybutyrate) Quantitative 0.83 (*)    LIPASE - Normal    Lipase 26     LACTIC ACID WHOLE BLOOD - Normal    Lactic Acid 0.9     MAGNESIUM - Normal    Magnesium 2.1     BLOOD CULTURE       Imaging   Abdomen XR 1 vw   Final Result    IMPRESSION: NG tube tip and sidehole in the stomach. Urinary excretion of IV contrast. Gaseous dilation of a few loops of proximal small bowel, suggesting mechanical small bowel obstruction. Pelvic surgical clips.       CT Abdomen Pelvis w Contrast   Final Result   IMPRESSION:    1.  Acute small bowel obstruction with multiple adjacent transition points in the left midabdomen resulting in a portion which has a closed loop morphology. No evidence of perforation or ischemia.   2.  Circumferential urinary bladder wall thickening suggesting infectious or inflammatory cystitis. Correlate with urinalysis.      Dr. Carey Kim was contacted by me on 10/24/2024 7:25 PM CDT and verbalized understanding of the result.         XR Gastrografin Challenge    (Results Pending)         Independent Interpretation   None    ED Course      Medications Administered   Medications   lidocaine 1 % 0.1-1 mL (has no administration in time range)   lidocaine (LMX4) cream (has no administration in time range)   sodium chloride (PF) 0.9% PF flush 3 mL (has no administration in time range)   sodium chloride (PF) 0.9% PF flush 3 mL (has no administration in time range)   senna-docusate (SENOKOT-S/PERICOLACE) 8.6-50 MG per tablet 1 tablet (has no administration in time range)     Or   senna-docusate (SENOKOT-S/PERICOLACE) 8.6-50 MG per tablet 2 tablet (has no administration in time range)   calcium carbonate (TUMS) chewable tablet 1,000 mg (has no administration in time range)   acetaminophen (TYLENOL) tablet 650 mg (has no administration in time range)     Or   acetaminophen (TYLENOL) Suppository 650 mg (has no administration in time range)   HYDROmorphone (DILAUDID) injection 0.2 mg (has no administration in time range)   HYDROmorphone (DILAUDID) injection 0.4 mg (0.4 mg Intravenous $Given 10/25/24 0009)   ondansetron (ZOFRAN ODT) ODT tab 4 mg (has no administration in time range)     Or   ondansetron (ZOFRAN) injection 4 mg (has no  administration in time range)   prochlorperazine (COMPAZINE) injection 5 mg (has no administration in time range)     Or   prochlorperazine (COMPAZINE) tablet 5 mg (has no administration in time range)     Or   prochlorperazine (COMPAZINE) suppository 12.5 mg (has no administration in time range)   cefTRIAXone (ROCEPHIN) 2 g vial to attach to  ml bag for ADULTS or NS 50 ml bag for PEDS (has no administration in time range)   dextrose 5% in lactated ringers + KCl 20 mEq/L infusion (has no administration in time range)   buPROPion (WELLBUTRIN SR) 12 hr tablet 150 mg (has no administration in time range)   gabapentin (NEURONTIN) capsule 900 mg (has no administration in time range)   LORazepam (ATIVAN) tablet 0.5 mg (has no administration in time range)   LORazepam (ATIVAN) tablet 0.5 mg (has no administration in time range)   fentaNYL (PF) (SUBLIMAZE) injection 50 mcg (50 mcg Intravenous $Given 10/24/24 1651)   ondansetron (ZOFRAN) injection 4 mg (4 mg Intravenous $Given 10/24/24 1911)   sodium chloride 0.9% BOLUS 500 mL (0 mLs Intravenous Stopped 10/24/24 2038)   cefTRIAXone (ROCEPHIN) 2 g vial to attach to  ml bag for ADULTS or NS 50 ml bag for PEDS (0 g Intravenous Stopped 10/24/24 2008)   HYDROmorphone (PF) (DILAUDID) injection 0.3 mg (0.3 mg Intravenous $Given 10/24/24 1841)   CT scan flush (55 mLs Intravenous $Given 10/24/24 1854)   iopamidol (ISOVUE-370) solution 500 mL (59 mLs Intravenous $Given 10/24/24 1854)   ketorolac (TORADOL) injection 15 mg (15 mg Intravenous $Given 10/24/24 2008)   midazolam (VERSED) injection 0.5 mg (0.5 mg Intravenous $Given 10/24/24 2007)   diatrizoate meglumine-sodium (GASTROGRAFIN) 120 mL in sterile water (bottle) 150 mL (150 mLs Nasogastric $Given 10/24/24 2311)   LORazepam (ATIVAN) injection 0.5 mg (0.5 mg Intravenous $Given 10/24/24 1529)       Procedures   Procedures     Discussion of Management   Admitting Hospitalist, Dr. Alonzo  Surgery, Dr. Sanchez    ED Course    ED Course as of 10/25/24 0040   Thu Oct 24, 2024   1925 Spoke with radiology   1941 edmund   1950 Spoke with Dr. Sanchez general surgery.   1956 Spoke with Dr. Alonzo hospitalist who accepts admission       Additional Documentation  None    Medical Decision Making / Diagnosis     CMS Diagnoses: None    MIPS       None    MDM   Maria Esther Miller is a 74 year old female who presents to the ED due to abdominal pain with concern for possible SBO.  History of SBO and cecal volvulus, hysterectomy, colectomy s/p reversal.  Symptoms are concerning for recurrent SBO given decreased flatulence, nausea, vomiting.  Will keep NPO.     Labs are reassuring except for a slight leukocytosis which I think is likely reactive from her vomiting.  Lactic is WNL.  Did have nitrites in her urine at urgent care so we will obtain a blood culture and start Rocephin.    CT scan of the abdomen and pelvis shows small bowel obstruction with 2 transition points and a relatively small loop of bowel in between the 2 transition points.  There is no signs of ischemia radiographically and her exam is reassuring with minimal reproducible abdominal pain, labs with a normal lactic.     Due to complicated SBO with multiple transition points, involved general surgery early on.  They kindly agreed to see the patient, agree with NG tube and strict NPO.  Will start on maintenance fluids.  Patient understands all findings, need for NG tube for decompression, plan for admission to hospital.  Admitted to Dr. Alonzo who kindly accepts.    Disposition   The patient was admitted to the hospital.     Diagnosis     ICD-10-CM    1. Small bowel obstruction (H)  K56.609                  DO Judy Goldstein Ellen, DO  10/25/24 0040

## 2024-10-24 NOTE — ED TRIAGE NOTES
Presents to ED from  for abdominal pain X 24 hours. Pt vomited several times throughout the night. Xray shows SBO and UA shows UTI. Last SBO was in 2010. ABCs intact. A&OX4.      Triage Assessment (Adult)       Row Name 10/24/24 1512          Triage Assessment    Airway WDL WDL        Respiratory WDL    Respiratory WDL WDL        Skin Circulation/Temperature WDL    Skin Circulation/Temperature WDL WDL        Cardiac WDL    Cardiac WDL WDL        Peripheral/Neurovascular WDL    Peripheral Neurovascular WDL WDL        Cognitive/Neuro/Behavioral WDL    Cognitive/Neuro/Behavioral WDL WDL

## 2024-10-25 ENCOUNTER — ANESTHESIA (OUTPATIENT)
Dept: SURGERY | Facility: CLINIC | Age: 74
DRG: 336 | End: 2024-10-25
Payer: COMMERCIAL

## 2024-10-25 ENCOUNTER — APPOINTMENT (OUTPATIENT)
Dept: SURGERY | Facility: PHYSICIAN GROUP | Age: 74
End: 2024-10-25
Payer: COMMERCIAL

## 2024-10-25 ENCOUNTER — ANESTHESIA EVENT (OUTPATIENT)
Dept: SURGERY | Facility: CLINIC | Age: 74
DRG: 336 | End: 2024-10-25
Payer: COMMERCIAL

## 2024-10-25 ENCOUNTER — APPOINTMENT (OUTPATIENT)
Dept: GENERAL RADIOLOGY | Facility: CLINIC | Age: 74
DRG: 336 | End: 2024-10-25
Attending: SURGERY
Payer: COMMERCIAL

## 2024-10-25 LAB
ANION GAP SERPL CALCULATED.3IONS-SCNC: 12 MMOL/L (ref 7–15)
BUN SERPL-MCNC: 18.2 MG/DL (ref 8–23)
CALCIUM SERPL-MCNC: 9.5 MG/DL (ref 8.8–10.4)
CHLORIDE SERPL-SCNC: 104 MMOL/L (ref 98–107)
CREAT SERPL-MCNC: 0.88 MG/DL (ref 0.51–0.95)
EGFRCR SERPLBLD CKD-EPI 2021: 69 ML/MIN/1.73M2
ERYTHROCYTE [DISTWIDTH] IN BLOOD BY AUTOMATED COUNT: 12.9 % (ref 10–15)
GLUCOSE BLDC GLUCOMTR-MCNC: 138 MG/DL (ref 70–99)
GLUCOSE SERPL-MCNC: 155 MG/DL (ref 70–99)
HCO3 SERPL-SCNC: 25 MMOL/L (ref 22–29)
HCT VFR BLD AUTO: 42.5 % (ref 35–47)
HGB BLD-MCNC: 14.3 G/DL (ref 11.7–15.7)
MAGNESIUM SERPL-MCNC: 2.1 MG/DL (ref 1.7–2.3)
MAGNESIUM SERPL-MCNC: 2.2 MG/DL (ref 1.7–2.3)
MCH RBC QN AUTO: 31.5 PG (ref 26.5–33)
MCHC RBC AUTO-ENTMCNC: 33.6 G/DL (ref 31.5–36.5)
MCV RBC AUTO: 94 FL (ref 78–100)
PLATELET # BLD AUTO: 271 10E3/UL (ref 150–450)
POTASSIUM SERPL-SCNC: 3.9 MMOL/L (ref 3.4–5.3)
RBC # BLD AUTO: 4.54 10E6/UL (ref 3.8–5.2)
SODIUM SERPL-SCNC: 141 MMOL/L (ref 135–145)
WBC # BLD AUTO: 13.9 10E3/UL (ref 4–11)

## 2024-10-25 PROCEDURE — 710N000009 HC RECOVERY PHASE 1, LEVEL 1, PER MIN: Performed by: STUDENT IN AN ORGANIZED HEALTH CARE EDUCATION/TRAINING PROGRAM

## 2024-10-25 PROCEDURE — 0DN80ZZ RELEASE SMALL INTESTINE, OPEN APPROACH: ICD-10-PCS | Performed by: STUDENT IN AN ORGANIZED HEALTH CARE EDUCATION/TRAINING PROGRAM

## 2024-10-25 PROCEDURE — 74018 RADEX ABDOMEN 1 VIEW: CPT

## 2024-10-25 PROCEDURE — 370N000017 HC ANESTHESIA TECHNICAL FEE, PER MIN: Performed by: STUDENT IN AN ORGANIZED HEALTH CARE EDUCATION/TRAINING PROGRAM

## 2024-10-25 PROCEDURE — 99232 SBSQ HOSP IP/OBS MODERATE 35: CPT | Performed by: PHYSICIAN ASSISTANT

## 2024-10-25 PROCEDURE — 250N000011 HC RX IP 250 OP 636: Performed by: NURSE ANESTHETIST, CERTIFIED REGISTERED

## 2024-10-25 PROCEDURE — 250N000013 HC RX MED GY IP 250 OP 250 PS 637: Performed by: PHYSICIAN ASSISTANT

## 2024-10-25 PROCEDURE — 272N000001 HC OR GENERAL SUPPLY STERILE: Performed by: STUDENT IN AN ORGANIZED HEALTH CARE EDUCATION/TRAINING PROGRAM

## 2024-10-25 PROCEDURE — 83735 ASSAY OF MAGNESIUM: CPT | Performed by: STUDENT IN AN ORGANIZED HEALTH CARE EDUCATION/TRAINING PROGRAM

## 2024-10-25 PROCEDURE — 999N000141 HC STATISTIC PRE-PROCEDURE NURSING ASSESSMENT: Performed by: STUDENT IN AN ORGANIZED HEALTH CARE EDUCATION/TRAINING PROGRAM

## 2024-10-25 PROCEDURE — 360N000076 HC SURGERY LEVEL 3, PER MIN: Performed by: STUDENT IN AN ORGANIZED HEALTH CARE EDUCATION/TRAINING PROGRAM

## 2024-10-25 PROCEDURE — 258N000001 HC RX 258: Performed by: STUDENT IN AN ORGANIZED HEALTH CARE EDUCATION/TRAINING PROGRAM

## 2024-10-25 PROCEDURE — 44005 FREEING OF BOWEL ADHESION: CPT | Mod: AS | Performed by: PHYSICIAN ASSISTANT

## 2024-10-25 PROCEDURE — 250N000009 HC RX 250: Performed by: NURSE ANESTHETIST, CERTIFIED REGISTERED

## 2024-10-25 PROCEDURE — 258N000001 HC RX 258: Performed by: PHYSICIAN ASSISTANT

## 2024-10-25 PROCEDURE — 250N000011 HC RX IP 250 OP 636: Performed by: STUDENT IN AN ORGANIZED HEALTH CARE EDUCATION/TRAINING PROGRAM

## 2024-10-25 PROCEDURE — 120N000001 HC R&B MED SURG/OB

## 2024-10-25 PROCEDURE — 85014 HEMATOCRIT: CPT | Performed by: STUDENT IN AN ORGANIZED HEALTH CARE EDUCATION/TRAINING PROGRAM

## 2024-10-25 PROCEDURE — 258N000003 HC RX IP 258 OP 636: Performed by: NURSE ANESTHETIST, CERTIFIED REGISTERED

## 2024-10-25 PROCEDURE — 0DNU0ZZ RELEASE OMENTUM, OPEN APPROACH: ICD-10-PCS | Performed by: STUDENT IN AN ORGANIZED HEALTH CARE EDUCATION/TRAINING PROGRAM

## 2024-10-25 PROCEDURE — 0DNW4ZZ RELEASE PERITONEUM, PERCUTANEOUS ENDOSCOPIC APPROACH: ICD-10-PCS | Performed by: STUDENT IN AN ORGANIZED HEALTH CARE EDUCATION/TRAINING PROGRAM

## 2024-10-25 PROCEDURE — 250N000009 HC RX 250: Performed by: STUDENT IN AN ORGANIZED HEALTH CARE EDUCATION/TRAINING PROGRAM

## 2024-10-25 PROCEDURE — 250N000025 HC SEVOFLURANE, PER MIN: Performed by: STUDENT IN AN ORGANIZED HEALTH CARE EDUCATION/TRAINING PROGRAM

## 2024-10-25 PROCEDURE — 44005 FREEING OF BOWEL ADHESION: CPT | Performed by: STUDENT IN AN ORGANIZED HEALTH CARE EDUCATION/TRAINING PROGRAM

## 2024-10-25 PROCEDURE — 250N000013 HC RX MED GY IP 250 OP 250 PS 637: Performed by: STUDENT IN AN ORGANIZED HEALTH CARE EDUCATION/TRAINING PROGRAM

## 2024-10-25 PROCEDURE — 250N000011 HC RX IP 250 OP 636: Performed by: ANESTHESIOLOGY

## 2024-10-25 PROCEDURE — 36415 COLL VENOUS BLD VENIPUNCTURE: CPT | Performed by: STUDENT IN AN ORGANIZED HEALTH CARE EDUCATION/TRAINING PROGRAM

## 2024-10-25 PROCEDURE — 80048 BASIC METABOLIC PNL TOTAL CA: CPT | Performed by: STUDENT IN AN ORGANIZED HEALTH CARE EDUCATION/TRAINING PROGRAM

## 2024-10-25 RX ORDER — DEXAMETHASONE SODIUM PHOSPHATE 4 MG/ML
INJECTION, SOLUTION INTRA-ARTICULAR; INTRALESIONAL; INTRAMUSCULAR; INTRAVENOUS; SOFT TISSUE PRN
Status: DISCONTINUED | OUTPATIENT
Start: 2024-10-25 | End: 2024-10-25

## 2024-10-25 RX ORDER — LIDOCAINE 40 MG/G
CREAM TOPICAL
Status: DISCONTINUED | OUTPATIENT
Start: 2024-10-25 | End: 2024-10-25 | Stop reason: HOSPADM

## 2024-10-25 RX ORDER — BUPIVACAINE HYDROCHLORIDE AND EPINEPHRINE 5; 5 MG/ML; UG/ML
INJECTION, SOLUTION EPIDURAL; INTRACAUDAL; PERINEURAL PRN
Status: DISCONTINUED | OUTPATIENT
Start: 2024-10-25 | End: 2024-10-25 | Stop reason: HOSPADM

## 2024-10-25 RX ORDER — ONDANSETRON 4 MG/1
4 TABLET, ORALLY DISINTEGRATING ORAL EVERY 30 MIN PRN
Status: DISCONTINUED | OUTPATIENT
Start: 2024-10-25 | End: 2024-10-25 | Stop reason: HOSPADM

## 2024-10-25 RX ORDER — SERTRALINE HYDROCHLORIDE 100 MG/1
100 TABLET, FILM COATED ORAL DAILY
COMMUNITY

## 2024-10-25 RX ORDER — DIAZEPAM 10 MG/2ML
2.5 INJECTION, SOLUTION INTRAMUSCULAR; INTRAVENOUS
Status: DISCONTINUED | OUTPATIENT
Start: 2024-10-25 | End: 2024-10-25 | Stop reason: HOSPADM

## 2024-10-25 RX ORDER — ONDANSETRON 2 MG/ML
4 INJECTION INTRAMUSCULAR; INTRAVENOUS EVERY 30 MIN PRN
Status: DISCONTINUED | OUTPATIENT
Start: 2024-10-25 | End: 2024-10-25 | Stop reason: HOSPADM

## 2024-10-25 RX ORDER — EPHEDRINE SULFATE 50 MG/ML
INJECTION, SOLUTION INTRAMUSCULAR; INTRAVENOUS; SUBCUTANEOUS PRN
Status: DISCONTINUED | OUTPATIENT
Start: 2024-10-25 | End: 2024-10-25

## 2024-10-25 RX ORDER — ACETAMINOPHEN 325 MG/1
975 TABLET ORAL ONCE
Status: DISCONTINUED | OUTPATIENT
Start: 2024-10-25 | End: 2024-10-25 | Stop reason: HOSPADM

## 2024-10-25 RX ORDER — HYDROMORPHONE HCL IN WATER/PF 6 MG/30 ML
0.2 PATIENT CONTROLLED ANALGESIA SYRINGE INTRAVENOUS EVERY 5 MIN PRN
Status: DISCONTINUED | OUTPATIENT
Start: 2024-10-25 | End: 2024-10-25 | Stop reason: HOSPADM

## 2024-10-25 RX ORDER — PROPOFOL 10 MG/ML
INJECTION, EMULSION INTRAVENOUS PRN
Status: DISCONTINUED | OUTPATIENT
Start: 2024-10-25 | End: 2024-10-25

## 2024-10-25 RX ORDER — FENTANYL CITRATE 50 UG/ML
INJECTION, SOLUTION INTRAMUSCULAR; INTRAVENOUS PRN
Status: DISCONTINUED | OUTPATIENT
Start: 2024-10-25 | End: 2024-10-25

## 2024-10-25 RX ORDER — LIDOCAINE HYDROCHLORIDE 20 MG/ML
INJECTION, SOLUTION INFILTRATION; PERINEURAL PRN
Status: DISCONTINUED | OUTPATIENT
Start: 2024-10-25 | End: 2024-10-25

## 2024-10-25 RX ORDER — DEXAMETHASONE SODIUM PHOSPHATE 4 MG/ML
4 INJECTION, SOLUTION INTRA-ARTICULAR; INTRALESIONAL; INTRAMUSCULAR; INTRAVENOUS; SOFT TISSUE
Status: DISCONTINUED | OUTPATIENT
Start: 2024-10-25 | End: 2024-10-25 | Stop reason: HOSPADM

## 2024-10-25 RX ORDER — BUPROPION HYDROCHLORIDE 150 MG/1
300 TABLET, EXTENDED RELEASE ORAL EVERY MORNING
Status: DISCONTINUED | OUTPATIENT
Start: 2024-10-26 | End: 2024-11-02 | Stop reason: HOSPADM

## 2024-10-25 RX ORDER — FENTANYL CITRATE 50 UG/ML
25 INJECTION, SOLUTION INTRAMUSCULAR; INTRAVENOUS EVERY 5 MIN PRN
Status: DISCONTINUED | OUTPATIENT
Start: 2024-10-25 | End: 2024-10-25 | Stop reason: HOSPADM

## 2024-10-25 RX ORDER — FENTANYL CITRATE 50 UG/ML
50 INJECTION, SOLUTION INTRAMUSCULAR; INTRAVENOUS EVERY 5 MIN PRN
Status: DISCONTINUED | OUTPATIENT
Start: 2024-10-25 | End: 2024-10-25 | Stop reason: HOSPADM

## 2024-10-25 RX ORDER — NALOXONE HYDROCHLORIDE 0.4 MG/ML
0.2 INJECTION, SOLUTION INTRAMUSCULAR; INTRAVENOUS; SUBCUTANEOUS
Status: DISCONTINUED | OUTPATIENT
Start: 2024-10-25 | End: 2024-11-02 | Stop reason: HOSPADM

## 2024-10-25 RX ORDER — ATORVASTATIN CALCIUM 20 MG/1
20 TABLET, FILM COATED ORAL EVERY EVENING
Status: DISCONTINUED | OUTPATIENT
Start: 2024-10-25 | End: 2024-11-02 | Stop reason: HOSPADM

## 2024-10-25 RX ORDER — SODIUM CHLORIDE, SODIUM LACTATE, POTASSIUM CHLORIDE, CALCIUM CHLORIDE 600; 310; 30; 20 MG/100ML; MG/100ML; MG/100ML; MG/100ML
INJECTION, SOLUTION INTRAVENOUS CONTINUOUS PRN
Status: DISCONTINUED | OUTPATIENT
Start: 2024-10-25 | End: 2024-10-25

## 2024-10-25 RX ORDER — ALBUTEROL SULFATE 0.83 MG/ML
2.5 SOLUTION RESPIRATORY (INHALATION) EVERY 4 HOURS PRN
Status: DISCONTINUED | OUTPATIENT
Start: 2024-10-25 | End: 2024-10-25 | Stop reason: HOSPADM

## 2024-10-25 RX ORDER — BUPROPION HYDROCHLORIDE 150 MG/1
150 TABLET, EXTENDED RELEASE ORAL EVERY EVENING
Status: DISCONTINUED | OUTPATIENT
Start: 2024-10-25 | End: 2024-11-02 | Stop reason: HOSPADM

## 2024-10-25 RX ORDER — MAGNESIUM HYDROXIDE 1200 MG/15ML
LIQUID ORAL PRN
Status: DISCONTINUED | OUTPATIENT
Start: 2024-10-25 | End: 2024-10-25 | Stop reason: HOSPADM

## 2024-10-25 RX ORDER — ONDANSETRON 2 MG/ML
INJECTION INTRAMUSCULAR; INTRAVENOUS PRN
Status: DISCONTINUED | OUTPATIENT
Start: 2024-10-25 | End: 2024-10-25

## 2024-10-25 RX ORDER — BUPROPION HYDROCHLORIDE 150 MG/1
300 TABLET, EXTENDED RELEASE ORAL EVERY MORNING
COMMUNITY

## 2024-10-25 RX ORDER — LORAZEPAM 0.5 MG/1
1 TABLET ORAL AT BEDTIME
COMMUNITY

## 2024-10-25 RX ORDER — NALOXONE HYDROCHLORIDE 0.4 MG/ML
0.4 INJECTION, SOLUTION INTRAMUSCULAR; INTRAVENOUS; SUBCUTANEOUS
Status: DISCONTINUED | OUTPATIENT
Start: 2024-10-25 | End: 2024-11-02 | Stop reason: HOSPADM

## 2024-10-25 RX ORDER — HYDROMORPHONE HCL IN WATER/PF 6 MG/30 ML
0.4 PATIENT CONTROLLED ANALGESIA SYRINGE INTRAVENOUS EVERY 5 MIN PRN
Status: DISCONTINUED | OUTPATIENT
Start: 2024-10-25 | End: 2024-10-25 | Stop reason: HOSPADM

## 2024-10-25 RX ORDER — DIPHENHYDRAMINE HCL 12.5MG/5ML
12.5 LIQUID (ML) ORAL EVERY 6 HOURS PRN
Status: DISCONTINUED | OUTPATIENT
Start: 2024-10-25 | End: 2024-10-25 | Stop reason: HOSPADM

## 2024-10-25 RX ORDER — LABETALOL HYDROCHLORIDE 5 MG/ML
10 INJECTION, SOLUTION INTRAVENOUS
Status: DISCONTINUED | OUTPATIENT
Start: 2024-10-25 | End: 2024-10-25 | Stop reason: HOSPADM

## 2024-10-25 RX ORDER — DIPHENHYDRAMINE HYDROCHLORIDE 50 MG/ML
12.5 INJECTION INTRAMUSCULAR; INTRAVENOUS EVERY 6 HOURS PRN
Status: DISCONTINUED | OUTPATIENT
Start: 2024-10-25 | End: 2024-10-25 | Stop reason: HOSPADM

## 2024-10-25 RX ORDER — TRETINOIN 1 MG/G
CREAM TOPICAL AT BEDTIME
COMMUNITY

## 2024-10-25 RX ORDER — SERTRALINE HYDROCHLORIDE 100 MG/1
100 TABLET, FILM COATED ORAL DAILY
Status: DISCONTINUED | OUTPATIENT
Start: 2024-10-26 | End: 2024-11-02 | Stop reason: HOSPADM

## 2024-10-25 RX ORDER — CEFAZOLIN SODIUM/WATER 2 G/20 ML
2 SYRINGE (ML) INTRAVENOUS
Status: COMPLETED | OUTPATIENT
Start: 2024-10-25 | End: 2024-10-25

## 2024-10-25 RX ORDER — ATORVASTATIN CALCIUM 20 MG/1
20 TABLET, FILM COATED ORAL EVERY EVENING
COMMUNITY

## 2024-10-25 RX ORDER — HYDRALAZINE HYDROCHLORIDE 20 MG/ML
2.5-5 INJECTION INTRAMUSCULAR; INTRAVENOUS EVERY 10 MIN PRN
Status: DISCONTINUED | OUTPATIENT
Start: 2024-10-25 | End: 2024-10-25 | Stop reason: HOSPADM

## 2024-10-25 RX ORDER — NALOXONE HYDROCHLORIDE 0.4 MG/ML
0.1 INJECTION, SOLUTION INTRAMUSCULAR; INTRAVENOUS; SUBCUTANEOUS
Status: DISCONTINUED | OUTPATIENT
Start: 2024-10-25 | End: 2024-10-25 | Stop reason: HOSPADM

## 2024-10-25 RX ORDER — GLYCOPYRROLATE 0.2 MG/ML
INJECTION, SOLUTION INTRAMUSCULAR; INTRAVENOUS PRN
Status: DISCONTINUED | OUTPATIENT
Start: 2024-10-25 | End: 2024-10-25

## 2024-10-25 RX ORDER — KETOROLAC TROMETHAMINE 30 MG/ML
INJECTION, SOLUTION INTRAMUSCULAR; INTRAVENOUS PRN
Status: DISCONTINUED | OUTPATIENT
Start: 2024-10-25 | End: 2024-10-25

## 2024-10-25 RX ORDER — SODIUM CHLORIDE, SODIUM LACTATE, POTASSIUM CHLORIDE, CALCIUM CHLORIDE 600; 310; 30; 20 MG/100ML; MG/100ML; MG/100ML; MG/100ML
INJECTION, SOLUTION INTRAVENOUS CONTINUOUS
Status: DISCONTINUED | OUTPATIENT
Start: 2024-10-25 | End: 2024-10-25 | Stop reason: HOSPADM

## 2024-10-25 RX ORDER — CEFAZOLIN SODIUM/WATER 2 G/20 ML
2 SYRINGE (ML) INTRAVENOUS SEE ADMIN INSTRUCTIONS
Status: DISCONTINUED | OUTPATIENT
Start: 2024-10-25 | End: 2024-10-25 | Stop reason: HOSPADM

## 2024-10-25 RX ORDER — BUPROPION HYDROCHLORIDE 150 MG/1
150 TABLET, EXTENDED RELEASE ORAL EVERY EVENING
COMMUNITY

## 2024-10-25 RX ADMIN — EPHEDRINE SULFATE 10 MG: 5 INJECTION INTRAVENOUS at 10:40

## 2024-10-25 RX ADMIN — ONDANSETRON 4 MG: 2 INJECTION INTRAMUSCULAR; INTRAVENOUS at 10:16

## 2024-10-25 RX ADMIN — DEXTROSE MONOHYDRATE, SODIUM CHLORIDE, SODIUM LACTATE, POTASSIUM CHLORIDE, CALCIUM CHLORIDE: 5; 600; 310; 179; 20 INJECTION, SOLUTION INTRAVENOUS at 16:59

## 2024-10-25 RX ADMIN — HYDROMORPHONE HYDROCHLORIDE 0.2 MG: 0.2 INJECTION, SOLUTION INTRAMUSCULAR; INTRAVENOUS; SUBCUTANEOUS at 13:16

## 2024-10-25 RX ADMIN — ROCURONIUM BROMIDE 5 MG: 50 INJECTION, SOLUTION INTRAVENOUS at 10:16

## 2024-10-25 RX ADMIN — FENTANYL CITRATE 25 MCG: 0.05 INJECTION, SOLUTION INTRAMUSCULAR; INTRAVENOUS at 12:51

## 2024-10-25 RX ADMIN — BUPROPION HYDROCHLORIDE 150 MG: 150 TABLET, FILM COATED, EXTENDED RELEASE ORAL at 19:02

## 2024-10-25 RX ADMIN — PHENYLEPHRINE HYDROCHLORIDE 100 MCG: 10 INJECTION INTRAVENOUS at 11:15

## 2024-10-25 RX ADMIN — HYDROMORPHONE HYDROCHLORIDE 0.4 MG: 0.2 INJECTION, SOLUTION INTRAMUSCULAR; INTRAVENOUS; SUBCUTANEOUS at 03:36

## 2024-10-25 RX ADMIN — HYDROMORPHONE HYDROCHLORIDE 0.2 MG: 0.2 INJECTION, SOLUTION INTRAMUSCULAR; INTRAVENOUS; SUBCUTANEOUS at 18:00

## 2024-10-25 RX ADMIN — SODIUM CHLORIDE, POTASSIUM CHLORIDE, SODIUM LACTATE AND CALCIUM CHLORIDE: 600; 310; 30; 20 INJECTION, SOLUTION INTRAVENOUS at 11:57

## 2024-10-25 RX ADMIN — HYDROMORPHONE HYDROCHLORIDE 0.4 MG: 0.2 INJECTION, SOLUTION INTRAMUSCULAR; INTRAVENOUS; SUBCUTANEOUS at 08:44

## 2024-10-25 RX ADMIN — SODIUM CHLORIDE, POTASSIUM CHLORIDE, SODIUM LACTATE AND CALCIUM CHLORIDE: 600; 310; 30; 20 INJECTION, SOLUTION INTRAVENOUS at 10:13

## 2024-10-25 RX ADMIN — DEXAMETHASONE SODIUM PHOSPHATE 8 MG: 4 INJECTION, SOLUTION INTRA-ARTICULAR; INTRALESIONAL; INTRAMUSCULAR; INTRAVENOUS; SOFT TISSUE at 10:16

## 2024-10-25 RX ADMIN — FENTANYL CITRATE 100 MCG: 50 INJECTION INTRAMUSCULAR; INTRAVENOUS at 10:16

## 2024-10-25 RX ADMIN — FENTANYL CITRATE 25 MCG: 0.05 INJECTION, SOLUTION INTRAMUSCULAR; INTRAVENOUS at 13:11

## 2024-10-25 RX ADMIN — SUGAMMADEX 200 MG: 100 INJECTION, SOLUTION INTRAVENOUS at 11:58

## 2024-10-25 RX ADMIN — FENTANYL CITRATE 25 MCG: 0.05 INJECTION, SOLUTION INTRAMUSCULAR; INTRAVENOUS at 13:04

## 2024-10-25 RX ADMIN — EPHEDRINE SULFATE 5 MG: 5 INJECTION INTRAVENOUS at 11:19

## 2024-10-25 RX ADMIN — DEXTROSE MONOHYDRATE, SODIUM CHLORIDE, SODIUM LACTATE, POTASSIUM CHLORIDE, CALCIUM CHLORIDE: 5; 600; 310; 179; 20 INJECTION, SOLUTION INTRAVENOUS at 01:23

## 2024-10-25 RX ADMIN — PROPOFOL 100 MG: 10 INJECTION, EMULSION INTRAVENOUS at 10:16

## 2024-10-25 RX ADMIN — LORAZEPAM 0.5 MG: 0.5 TABLET ORAL at 21:17

## 2024-10-25 RX ADMIN — ROCURONIUM BROMIDE 45 MG: 50 INJECTION, SOLUTION INTRAVENOUS at 10:20

## 2024-10-25 RX ADMIN — FENTANYL CITRATE 25 MCG: 0.05 INJECTION, SOLUTION INTRAMUSCULAR; INTRAVENOUS at 12:43

## 2024-10-25 RX ADMIN — PHENYLEPHRINE HYDROCHLORIDE 200 MCG: 10 INJECTION INTRAVENOUS at 10:21

## 2024-10-25 RX ADMIN — HYDROMORPHONE HYDROCHLORIDE 1 MG: 1 INJECTION, SOLUTION INTRAMUSCULAR; INTRAVENOUS; SUBCUTANEOUS at 10:16

## 2024-10-25 RX ADMIN — EPHEDRINE SULFATE 5 MG: 5 INJECTION INTRAVENOUS at 11:15

## 2024-10-25 RX ADMIN — CEFTRIAXONE 2 G: 2 INJECTION, POWDER, FOR SOLUTION INTRAMUSCULAR; INTRAVENOUS at 17:51

## 2024-10-25 RX ADMIN — PHENYLEPHRINE HYDROCHLORIDE 200 MCG: 10 INJECTION INTRAVENOUS at 10:36

## 2024-10-25 RX ADMIN — EPHEDRINE SULFATE 5 MG: 5 INJECTION INTRAVENOUS at 11:49

## 2024-10-25 RX ADMIN — HYDROMORPHONE HYDROCHLORIDE 0.4 MG: 0.2 INJECTION, SOLUTION INTRAMUSCULAR; INTRAVENOUS; SUBCUTANEOUS at 06:14

## 2024-10-25 RX ADMIN — PHENYLEPHRINE HYDROCHLORIDE 200 MCG: 10 INJECTION INTRAVENOUS at 10:28

## 2024-10-25 RX ADMIN — ONDANSETRON 4 MG: 2 INJECTION INTRAMUSCULAR; INTRAVENOUS at 06:20

## 2024-10-25 RX ADMIN — Medication 2 G: at 10:13

## 2024-10-25 RX ADMIN — GABAPENTIN 900 MG: 300 CAPSULE ORAL at 21:17

## 2024-10-25 RX ADMIN — PHENYLEPHRINE HYDROCHLORIDE 100 MCG: 10 INJECTION INTRAVENOUS at 11:49

## 2024-10-25 RX ADMIN — LIDOCAINE HYDROCHLORIDE 50 MG: 20 INJECTION, SOLUTION INFILTRATION; PERINEURAL at 10:16

## 2024-10-25 RX ADMIN — Medication 100 MG: at 10:16

## 2024-10-25 RX ADMIN — GLYCOPYRROLATE 0.2 MG: 0.2 INJECTION, SOLUTION INTRAMUSCULAR; INTRAVENOUS at 10:16

## 2024-10-25 RX ADMIN — HYDROMORPHONE HYDROCHLORIDE 0.2 MG: 0.2 INJECTION, SOLUTION INTRAMUSCULAR; INTRAVENOUS; SUBCUTANEOUS at 13:29

## 2024-10-25 RX ADMIN — KETOROLAC TROMETHAMINE 30 MG: 30 INJECTION, SOLUTION INTRAMUSCULAR at 10:16

## 2024-10-25 RX ADMIN — HYDROMORPHONE HYDROCHLORIDE 0.4 MG: 0.2 INJECTION, SOLUTION INTRAMUSCULAR; INTRAVENOUS; SUBCUTANEOUS at 00:09

## 2024-10-25 NOTE — ANESTHESIA CARE TRANSFER NOTE
Patient: Maria Esther Miller    Procedure: Procedure(s):  LAPAROTOMY, EXPLORATORY, WITH LYSIS OF ADHESIONS       Diagnosis: Small bowel obstruction (H) [K56.609]  Diagnosis Additional Information: No value filed.    Anesthesia Type:   General     Note:    Oropharynx: oral airway in place  Level of Consciousness: awake and drowsy  Oxygen Supplementation: face mask  Level of Supplemental Oxygen (L/min / FiO2): 8  Independent Airway: airway patency satisfactory and stable  Dentition: dentition unchanged  Vital Signs Stable: post-procedure vital signs reviewed and stable  Report to RN Given: handoff report given  Patient transferred to: PACU    Handoff Report: Identifed the Patient, Identified the Reponsible Provider, Reviewed the pertinent medical history, Discussed the surgical course, Reviewed Intra-OP anesthesia mangement and issues during anesthesia, Set expectations for post-procedure period and Allowed opportunity for questions and acknowledgement of understanding      Vitals:  Vitals Value Taken Time   /73 10/25/24 1215   Temp     Pulse 90 10/25/24 1219   Resp 9 10/25/24 1219   SpO2 99 % 10/25/24 1219   Vitals shown include unfiled device data.    Electronically Signed By: ALEXIA Franco CRNA  October 25, 2024  12:19 PM

## 2024-10-25 NOTE — ANESTHESIA PROCEDURE NOTES
Airway       Patient location during procedure: OR       Procedure Start/Stop Times: 10/25/2024 10:19 AM  Staff -        CRNA: Lincoln Hood APRN CRNA       Performed By: CRNAIndications and Patient Condition       Indications for airway management: lawson-procedural and airway protection       Induction type:RSI       Mask difficulty assessment: 0 - not attempted    Final Airway Details       Final airway type: endotracheal airway       Successful airway: ETT - single  Endotracheal Airway Details        ETT size (mm): 7.0       Cuffed: yes       Successful intubation technique: direct laryngoscopy       DL Blade Type: MAC 3       Grade View of Cords: 1       Adjucts: stylet       Position: Right       Measured from: gums/teeth       Secured at (cm): 20       Bite block used: None    Post intubation assessment        Placement verified by: capnometry, equal breath sounds and chest rise        Number of attempts at approach: 1       Secured with: tape       Ease of procedure: easy       Dentition: Intact    Medication(s) Administered   Medication Administration Time: 10/25/2024 10:19 AM

## 2024-10-25 NOTE — ANESTHESIA PREPROCEDURE EVALUATION
Anesthesia Pre-Procedure Evaluation    Patient: Maria Esther Miller   MRN: [id number] : 1950        Procedure : Procedure(s):  LAPAROTOMY, EXPLORATORY, WITH LYSIS OF ADHESIONS, POSSIBLE BOWEL RESECTION          No past medical history on file.   No past surgical history on file.   Allergies   Allergen Reactions    Darvocet [Propoxyphene N-Apap] Other (See Comments)     Constipation per patient.      Social History     Tobacco Use    Smoking status: Never    Smokeless tobacco: Never   Substance Use Topics    Alcohol use: Yes     Comment: socially      Wt Readings from Last 1 Encounters:   10/25/24 52.2 kg (115 lb 1.6 oz)        Anesthesia Evaluation   Pt has had prior anesthetic. Type: MAC and General.        ROS/MED HX  ENT/Pulmonary:  - neg pulmonary ROS     Neurologic:  - neg neurologic ROS     Cardiovascular:  - neg cardiovascular ROS     METS/Exercise Tolerance:     Hematologic:  - neg hematologic  ROS     Musculoskeletal:   (+)  arthritis,             GI/Hepatic: Comment: Recurrent bowel obstruction      Renal/Genitourinary:  - neg Renal ROS     Endo:  - neg endo ROS     Psychiatric/Substance Use:     (+) psychiatric history anxiety and depression       Infectious Disease:  - neg infectious disease ROS     Malignancy:  - neg malignancy ROS     Other:  - neg other ROS          Physical Exam    Airway        Mallampati: II   TM distance: > 3 FB   Neck ROM: full   Mouth opening: > 3 cm    Respiratory Devices and Support         Dental           Cardiovascular   cardiovascular exam normal       Rhythm and rate: regular     Pulmonary   pulmonary exam normal        breath sounds clear to auscultation       Other findings: Lab Test        10/25/24     10/24/24                       0515          1538          WBC          13.9*        11.4*         HGB          14.3         15.2          MCV          94           93            PLT          271          298            Lab Test        10/25/24     10/24/24                        0515          1538          NA           141          138           POTASSIUM    3.9          4.3           CHLORIDE     104          99            CO2          25           21*           BUN          18.2         14.3          CR           0.88         0.82          ANIONGAP     12           18*           JOSE ANTONIO          9.5          9.8           GLC          155*         120*                 EKG Interpretation:   Sinus rhythm with Premature supraventricular complexes  Otherwise normal ECG  Unconfirmed report - interpretation of this ECG is computer generated - see  medical record for final interpretation       OUTSIDE LABS:  CBC:   Lab Results   Component Value Date    WBC 13.9 (H) 10/25/2024    WBC 11.4 (H) 10/24/2024    HGB 14.3 10/25/2024    HGB 15.2 10/24/2024    HCT 42.5 10/25/2024    HCT 43.6 10/24/2024     10/25/2024     10/24/2024     BMP:   Lab Results   Component Value Date     10/25/2024     10/24/2024    POTASSIUM 3.9 10/25/2024    POTASSIUM 4.3 10/24/2024    CHLORIDE 104 10/25/2024    CHLORIDE 99 10/24/2024    CO2 25 10/25/2024    CO2 21 (L) 10/24/2024    BUN 18.2 10/25/2024    BUN 14.3 10/24/2024    CR 0.88 10/25/2024    CR 0.82 10/24/2024     (H) 10/25/2024     (H) 10/24/2024     COAGS:   Lab Results   Component Value Date    INR 1.12 01/11/2010     POC:   Lab Results   Component Value Date    BGM 77 04/16/2010     HEPATIC:   Lab Results   Component Value Date    ALBUMIN 4.7 10/24/2024    PROTTOTAL 7.6 10/24/2024    ALT 27 10/24/2024    AST 43 10/24/2024    ALKPHOS 80 10/24/2024    BILITOTAL 0.4 10/24/2024     OTHER:   Lab Results   Component Value Date    LACT 0.9 10/24/2024    JOSE ANTONIO 9.5 10/25/2024    PHOS 3.9 01/11/2010    MAG 2.2 10/25/2024    LIPASE 26 10/24/2024       Anesthesia Plan    ASA Status:  3, emergent    NPO Status:  ELEVATED Aspiration Risk/Unknown    Anesthesia Type: General.     - Airway: ETT   Induction: RSI, Intravenous.   Maintenance:  Balanced.        Consents    Anesthesia Plan(s) and associated risks, benefits, and realistic alternatives discussed. Questions answered and patient/representative(s) expressed understanding.     - Discussed: Risks, Benefits and Alternatives for BOTH SEDATION and the PROCEDURE were discussed     - Discussed with:  Patient      - Extended Intubation/Ventilatory Support Discussed: No.      - Patient is DNR/DNI Status: No          Postoperative Care    Pain management: IV analgesics, Oral pain medications, Multi-modal analgesia.   PONV prophylaxis: Ondansetron (or other 5HT-3), Dexamethasone or Solumedrol, Background Propofol Infusion     Comments:               Kyle Ornelas MD    I have reviewed the pertinent notes and labs in the chart from the past 30 days and (re)examined the patient.  Any updates or changes from those notes are reflected in this note.

## 2024-10-25 NOTE — ANESTHESIA POSTPROCEDURE EVALUATION
Patient: Maria Esther Miller    Procedure: Procedure(s):  LAPAROTOMY, EXPLORATORY, WITH LYSIS OF ADHESIONS       Anesthesia Type:  General    Note:  Disposition: Inpatient   Postop Pain Control: Uneventful            Sign Out: Well controlled pain   PONV: No   Neuro/Psych: Uneventful            Sign Out: Acceptable/Baseline neuro status   Airway/Respiratory: Uneventful            Sign Out: Acceptable/Baseline resp. status   CV/Hemodynamics: Uneventful            Sign Out: Acceptable CV status; No obvious hypovolemia; No obvious fluid overload   Other NRE: NONE   DID A NON-ROUTINE EVENT OCCUR? No           Last vitals:  Vitals Value Taken Time   /66 10/25/24 1220   Temp     Pulse 103 10/25/24 1220   Resp 25 10/25/24 1220   SpO2 75 % 10/25/24 1220   Vitals shown include unfiled device data.    Electronically Signed By: Kyle Ornelas MD  October 25, 2024  12:21 PM

## 2024-10-25 NOTE — PLAN OF CARE
"Pertinent assessments: A&O, VSS on 2L NC post op (not baseline). Mark CDI, draining well. No diet at this time, Hospitalist paged, no response. On D5 LR w/ KCL at 75. NG to LIS, with clear greenish output. Not oob, Ind at baseline. No pain at rest, 5/10 with movement.     Major Shift Events Admit to unit from PACU.    Treatment Plan: Pain management. Awaiting return of bowel function.     Bedside Nurse: Faye Farrell RN     Problem: Adult Inpatient Plan of Care  Goal: Plan of Care Review  Description: The Plan of Care Review/Shift note should be completed every shift.  The Outcome Evaluation is a brief statement about your assessment that the patient is improving, declining, or no change.  This information will be displayed automatically on your shift  note.  Outcome: Progressing  Flowsheets (Taken 10/25/2024 1527)  Outcome Evaluation: Treatment Plan: Pain management. Awaiting return of bowel function.  Plan of Care Reviewed With: patient  Overall Patient Progress: improving  Goal: Patient-Specific Goal (Individualized)  Description: You can add care plan individualizations to a care plan. Examples of Individualization might be:  \"Parent requests to be called daily at 9am for status\", \"I have a hard time hearing out of my right ear\", or \"Do not touch me to wake me up as it startles  me\".  Outcome: Progressing  Goal: Absence of Hospital-Acquired Illness or Injury  Outcome: Progressing  Goal: Optimal Comfort and Wellbeing  Outcome: Progressing  Goal: Readiness for Transition of Care  Outcome: Progressing     Problem: Intestinal Obstruction  Goal: Optimal Bowel Function  Outcome: Progressing  Goal: Fluid and Electrolyte Balance  Outcome: Progressing  Goal: Absence of Infection Signs and Symptoms  Outcome: Progressing  Goal: Optimize Nutrition Status  Outcome: Progressing  Goal: Optimal Pain Control and Function  Outcome: Progressing     Problem: Pain Acute  Goal: Optimal Pain Control and Function  Outcome: " Progressing   Goal Outcome Evaluation:      Plan of Care Reviewed With: patient    Overall Patient Progress: improvingOverall Patient Progress: improving    Outcome Evaluation: Treatment Plan: Pain management. Awaiting return of bowel function.

## 2024-10-25 NOTE — PROGRESS NOTES
10/25/24 1551   Skin   Skin WDL X;integrity   Skin Integrity abrasion/excoriation;other (see comments)   Abrasion / Excoriation Back;Left;Elbow  (eft elbow abrasion, several scratch marks on back self inflicted but not open.)   Other (see comments) abdominal incision  (from surgery)   Device Skin Interventions Taken Other (See comment)  (educated patient abut scratching)   Additional Documentation Focused Skin Inspection (Row)     Admission/Transfer from: PACU  2 RN skin assessment completed. Yes  Significant findings include: scratch marks on back self inflicted but not open. Small abrasion on left elbow but not bleeding or open, dry and not draining.   LDA added (if applicable)? Not Applicable  Requested WOC Nurse Consult from MD? Not Applicable

## 2024-10-25 NOTE — PROGRESS NOTES
"[facility]   General Surgery Progress Note           Assessment and Plan:   Assessment:   Small bowel obstruction  Failed gastrografin challenge. Increasing leukocytosis.    I have recommended operative intervention today and the patient agrees to proceed. She will be scheduled with Dr. Fofana, who is on call today.         Plan:   -To OR today for laparotomy, lysis of adhesions, possible small bowel resection  -Continue NG tube         Interval History:   Feeling about the same, continued moderate pain, + nausea when tube is clamped, no flatus, no BM. Afebrile.          Physical Exam:   Blood pressure 131/80, pulse 71, temperature 98.6  F (37  C), temperature source Oral, resp. rate 16, height 1.6 m (5' 3\"), weight 52.2 kg (115 lb 1.6 oz), SpO2 98%.    No intake/output data recorded.    Abdomen:   soft, mildly distended, tender in low midline          Data:     Recent Labs   Lab 10/25/24  0515 10/24/24  1538   WBC 13.9* 11.4*   HGB 14.3 15.2   HCT 42.5 43.6   MCV 94 93    298     Recent Labs   Lab 10/25/24  0515 10/25/24  0008 10/24/24  1538     --  138   POTASSIUM 3.9  --  4.3   CHLORIDE 104  --  99   CO2 25  --  21*   ANIONGAP 12  --  18*   *  --  120*   BUN 18.2  --  14.3   CR 0.88  --  0.82   GFRESTIMATED 69  --  76   JOSE ANTONIO 9.5  --  9.8   MAG 2.2 2.1  --    PROTTOTAL  --   --  7.6   ALBUMIN  --   --  4.7   BILITOTAL  --   --  0.4   ALKPHOS  --   --  80   AST  --   --  43   ALT  --   --  27       Karishma Sanchez MD        "

## 2024-10-25 NOTE — CONSULTS
"General Surgery Consultation    Maria Esther Miller MRN# [id number]   Age: 74 year old YOB: 1950     Date of Admission:  10/24/2024    Reason for consult:            Abdominal pain       Requesting physician:            Stuart Alonzo DO                Assessment and Plan:   Assessment:   Maria Esther Miller is a 74 year old female with a small bowel obstruction in the setting of multiple previous open operations. Imaging findings show multiple transition points in the pelvis and a possible closed loop obstruction. We discussed the options of surgical exploration tonight vs. a trial of conservative management. She reports that she would \"never, ever want another abdominal surgery and would rather die.\"  She did seem more receptive to the idea of surgery when we discussed the very low likelihood of needing a stoma again. Thankfully she does not have an exam consistent with ischemia, so a trial of conservative management is reasonable      Plan:   - Continue conservative management with bowel rest, IVF, and NG tube decompression.  - Gastrografin challenge ordered  - Surgical intervention may be needed if the clinical picture worsens or if the patient fails to progress with conservative measures.      Karishma Sanchez MD         Chief Complaint:   Abdominal pain    History is obtained from the patient         History of Present Illness:   Maria Esther Miller is a 74 year old  female who presents with abdominal pain diffusely for the past 1 day.  The pain is constant and cramping.  She had multiple episodes of vomiting overnight, none since early this morning now. She last passed flatus yesterday, last BM was this morning and was small and formed.  She has an extensive surgical history including a hysterectomy and left thigh melanoma resection which also involved an iliac lymph node resection (left oblique abdominal incision). This was followed by a sigmoid colectomy in the setting of sigmoid volvulus in 2009. Her sigmoid " colon was perforated during an attempt at endoscopic reduction and she underwent emergent sigmoid colectomy with end colostomy. While she had her colostomy, she had a small bowel obstruction requiring open lysis of adhesions. She then underwent colostomy reversal in 2010. Since that time, she has not had any bowel obstructions or further abdominal operations. She has also had         Past Medical History:    has no past medical history on file.          Past Surgical History:   Left thigh melanoma excision with left iliac sentinel lymph node biopsy, ~2001  Total abdominal hysterectomy  Sigmoid colectomy and end colostomy, 2009  Exploratory laparotomy and lysis of adhesions, 2010  Colostomy takedown, 2010         Social History:     Social History     Tobacco Use    Smoking status: Never    Smokeless tobacco: Never   Substance Use Topics    Alcohol use: Yes     Comment: socially             Family History:   No family history on file.  + family history of colon cancer         Allergies:     Allergies   Allergen Reactions    Darvocet [Propoxyphene N-Apap] Other (See Comments)     Constipation per patient.             Medications:     Current Facility-Administered Medications   Medication Dose Route Frequency Provider Last Rate Last Admin    [START ON 10/25/2024] cefTRIAXone (ROCEPHIN) 2 g vial to attach to  ml bag for ADULTS or NS 50 ml bag for PEDS  2 g Intravenous Once Stuart Alonzo DO        diatrizoate meglumine-sodium (GASTROGRAFIN) 120 mL in sterile water (bottle) 150 mL  150 mL Nasogastric Once Karishma Sanchez MD        lactated ringers infusion   Intravenous Continuous Stuart Alonzo DO         Current Outpatient Medications   Medication Sig Dispense Refill    buPROPion (WELLBUTRIN SR) 150 MG 12 hr tablet Take by mouth. Takes one tab in PM.      gabapentin (NEURONTIN) 300 MG capsule Take 900 mg by mouth At Bedtime.      lorazepam (ATIVAN) 0.5 MG tablet Take 0.5 mg by mouth. Takes one in the  "morning, one at noon as needed and two at bedtime.      BIOTIN PO Take 1 tablet by mouth daily.      estradiol (ESTRACE) 1 MG tablet Take 1 mg by mouth daily.      MULTIPLE VITAMINS PO Take 1 tablet by mouth daily.      Omega-3 Fatty Acids (FISH OIL) 500 MG CAPS Take 2 capsules by mouth 2 times daily.      Vitamin E (TOCOPHEROL) 1000 UNIT capsule Take 1,000 Units by mouth daily. As needed.       Current Facility-Administered Medications   Medication Dose Route Frequency Provider Last Rate Last Admin    [START ON 10/25/2024] cefTRIAXone (ROCEPHIN) 2 g vial to attach to  ml bag for ADULTS or NS 50 ml bag for PEDS  2 g Intravenous Once Stuart Alonzo DO        diatrizoate meglumine-sodium (GASTROGRAFIN) 120 mL in sterile water (bottle) 150 mL  150 mL Nasogastric Once Karishma Sanchez MD                Review of Systems:   The 10 point review of systems is negative other than noted in the HPI.          Physical Exam:   /75   Pulse 77   Temp 98.6  F (37  C) (Temporal)   Resp 18   Ht 1.6 m (5' 3\")   Wt 52.7 kg (116 lb 2.9 oz)   SpO2 97%   BMI 20.58 kg/m    General - Well developed, well nourished female in no apparent distress  Eyes:  no scleral icterus or redness  Lymphatic: No cervical, or supraclavicular lymphadenopathy  Lungs: Clear to auscultation bilaterally  Heart: regular rate and rhythm, no murmurs  Abdomen:soft, flat, non-distended with mild tenderness noted in the left lower quadrant.  No rebound or guarding.  Multiple healed surgical incisions.   MSK: Extremities warm without edema  Neurologic: nonfocal  Psychiatric: Mood and affect appropriate  Skin: Without lesions, rashes, or jaundice         Data:   Labs reviewed  Recent Labs   Lab 10/24/24  1538   WBC 11.4*   HGB 15.2   HCT 43.6   MCV 93        Recent Labs   Lab 10/24/24  1538      POTASSIUM 4.3   CHLORIDE 99   CO2 21*   ANIONGAP 18*   *   BUN 14.3   CR 0.82   GFRESTIMATED 76   JOSE ANTONIO 9.8   PROTTOTAL 7.6   ALBUMIN " 4.7   BILITOTAL 0.4   ALKPHOS 80   AST 43   ALT 27     Recent Labs   Lab 10/24/24  1757   LACT 0.9     Recent Labs   Lab 10/24/24  1538   LIPASE 26       Imaging:  All imaging studies reviewed by me  Results for orders placed or performed during the hospital encounter of 10/24/24   CT Abdomen Pelvis w Contrast    Impression    IMPRESSION:   1.  Acute small bowel obstruction with multiple adjacent transition points in the left midabdomen resulting in a portion which has a closed loop morphology. No evidence of perforation or ischemia.  2.  Circumferential urinary bladder wall thickening suggesting infectious or inflammatory cystitis. Correlate with urinalysis.    Dr. Carey Kim was contacted by me on 10/24/2024 7:25 PM CDT and verbalized understanding of the result.     Abdomen XR 1 vw    Impression    IMPRESSION: NG tube tip and sidehole in the stomach. Urinary excretion of IV contrast. Gaseous dilation of a few loops of proximal small bowel, suggesting mechanical small bowel obstruction. Pelvic surgical clips.      Karishma Sanchez MD  10/24/2024 10:54 PM     Time spent with the patient, reviewing the EMR, reviewing laboratory and imaging studies, more than 50% of which was counseling and coordinating care:  45 minutes.

## 2024-10-25 NOTE — ANESTHESIA POSTPROCEDURE EVALUATION
Patient: Maria Esther Miller    Procedure: Procedure(s):  LAPAROTOMY, EXPLORATORY, WITH LYSIS OF ADHESIONS       Anesthesia Type:  General    Note:  Disposition: Inpatient   Postop Pain Control: Uneventful            Sign Out: Well controlled pain   PONV: No   Neuro/Psych: Uneventful            Sign Out: Acceptable/Baseline neuro status   Airway/Respiratory: Uneventful            Sign Out: Acceptable/Baseline resp. status   CV/Hemodynamics: Uneventful            Sign Out: Acceptable CV status; No obvious hypovolemia; No obvious fluid overload   Other NRE: NONE   DID A NON-ROUTINE EVENT OCCUR? No           Last vitals:  Vitals Value Taken Time   /66 10/25/24 1220   Temp     Pulse 103 10/25/24 1220   Resp 25 10/25/24 1220   SpO2 75 % 10/25/24 1220   Vitals shown include unfiled device data.    Electronically Signed By: Kyle Ornelas MD  October 25, 2024  12:22 PM

## 2024-10-25 NOTE — PHARMACY-ADMISSION MEDICATION HISTORY
Pharmacist Admission Medication History    Admission medication history is complete. The information provided in this note is only as accurate as the sources available at the time of the update.    Information Source(s): Patient, Hospital records, and CareEverywhere/SureScripts via in-person    Pertinent Information: ----    Changes made to PTA medication list:  Added: heliocare, nicotinamide, calcium, wellbutrin sr am and pm dosing, lipitor, sertraline, retin a, lorazepam qhs  Deleted: biotin, estradion 1mg daily, prn old lorazepam entry, old wellbutrin entry, fish oil, vitamin e  Changed: None    Allergies reviewed with patient and updates made in EHR:  yes deleted darvocet per pt request (rxn constipation)    Medication History Completed By: Melida Connolly RP 10/25/2024 2:52 PM    PTA Med List   Medication Sig Last Dose/Taking    atorvastatin (LIPITOR) 20 MG tablet Take 20 mg by mouth every evening. 10/23/2024    buPROPion (WELLBUTRIN SR) 150 MG 12 hr tablet Take 300 mg by mouth every morning. 10/23/2024 Morning    buPROPion (WELLBUTRIN SR) 150 MG 12 hr tablet Take 150 mg by mouth every evening. 10/23/2024 Bedtime    Calcium Carbonate Antacid (CALCIUM CARBONATE PO) Take 2 tablets by mouth daily. Past Week Morning    gabapentin (NEURONTIN) 300 MG capsule Take 900 mg by mouth At Bedtime. 10/23/2024 Bedtime    LORazepam (ATIVAN) 0.5 MG tablet Take 1 mg by mouth at bedtime. Past Week Bedtime    MULTIPLE VITAMINS PO Take 1 tablet by mouth daily. Past Week Morning    Niacin-Polypodium Leucotomos (HELIOCARE ADVANCED PO) Take 2 tablets by mouth daily. Past Week Morning    Niacinamide-Zn-Cu-Dtveat-Lw-Yq (NICOTINAMIDE PO) Take 1 tablet by mouth daily. Past Week Morning    sertraline (ZOLOFT) 100 MG tablet Take 100 mg by mouth daily. 10/23/2024 Morning    tretinoin (RETIN-A) 0.1 % external cream Apply topically at bedtime. Past Week Bedtime

## 2024-10-25 NOTE — ED NOTES
[facility]  ED Nurse Handoff Report    ED Chief complaint: Abdominal Pain  . ED Diagnosis:   Final diagnoses:   Small bowel obstruction (H)       Allergies:   Allergies   Allergen Reactions    Darvocet [Propoxyphene N-Apap] Other (See Comments)     Constipation per patient.       Code Status: Full Code    Activity level - Baseline/Home:  independent.  Activity Level - Current:   standby.   Lift room needed: No.   Bariatric: No   Needed: No   Isolation: No.   Infection: Not Applicable.     Respiratory status: Room air    Vital Signs (within 30 minutes):   Vitals:    10/24/24 1745 10/24/24 1800 10/24/24 1900 10/24/24 2000   BP:  130/71 (!) 116/90 120/69   Pulse:    84   Resp:       Temp:       TempSrc:       SpO2: 99%   99%   Weight:       Height:           Cardiac Rhythm:  ,      Pain level:    Patient confused: No.   Patient Falls Risk: arm band in place and patient and family education.   Elimination Status: Has voided     Patient Report - Initial Complaint: abdominal pain.   Focused Assessment: Presents to ED from  for abdominal pain X 24 hours. Pt vomited several times throughout the night. Xray shows SBO and UA shows UTI. Last SBO was in 2010. ABCs intact. A&OX4.     Abnormal Results:   Labs Ordered and Resulted from Time of ED Arrival to Time of ED Departure   COMPREHENSIVE METABOLIC PANEL - Abnormal       Result Value    Sodium 138      Potassium 4.3      Carbon Dioxide (CO2) 21 (*)     Anion Gap 18 (*)     Urea Nitrogen 14.3      Creatinine 0.82      GFR Estimate 76      Calcium 9.8      Chloride 99      Glucose 120 (*)     Alkaline Phosphatase 80      AST 43      ALT 27      Protein Total 7.6      Albumin 4.7      Bilirubin Total 0.4     CBC WITH PLATELETS AND DIFFERENTIAL - Abnormal    WBC Count 11.4 (*)     RBC Count 4.71      Hemoglobin 15.2      Hematocrit 43.6      MCV 93      MCH 32.3      MCHC 34.9      RDW 12.5      Platelet Count 298      % Neutrophils 84      %  Lymphocytes 8      % Monocytes 8      % Eosinophils 0      % Basophils 0      % Immature Granulocytes 0      NRBCs per 100 WBC 0      Absolute Neutrophils 9.5 (*)     Absolute Lymphocytes 0.9      Absolute Monocytes 0.9      Absolute Eosinophils 0.0      Absolute Basophils 0.0      Absolute Immature Granulocytes 0.0      Absolute NRBCs 0.0     LIPASE - Normal    Lipase 26     LACTIC ACID WHOLE BLOOD - Normal    Lactic Acid 0.9     BLOOD CULTURE        CT Abdomen Pelvis w Contrast   Final Result   IMPRESSION:    1.  Acute small bowel obstruction with multiple adjacent transition points in the left midabdomen resulting in a portion which has a closed loop morphology. No evidence of perforation or ischemia.   2.  Circumferential urinary bladder wall thickening suggesting infectious or inflammatory cystitis. Correlate with urinalysis.      Dr. Carey Kim was contacted by me on 10/24/2024 7:25 PM CDT and verbalized understanding of the result.         Abdomen XR 1 vw    (Results Pending)       Treatments provided: See MAR  Family Comments: family at bedside and pt able to update family  OBS brochure/video discussed/provided to patient:  Yes  ED Medications:   Medications   fentaNYL (PF) (SUBLIMAZE) injection 50 mcg (50 mcg Intravenous $Given 10/24/24 1651)   ondansetron (ZOFRAN) injection 4 mg (4 mg Intravenous $Given 10/24/24 1911)   sodium chloride 0.9% BOLUS 500 mL (500 mLs Intravenous $New Bag 10/24/24 1909)   cefTRIAXone (ROCEPHIN) 2 g vial to attach to  ml bag for ADULTS or NS 50 ml bag for PEDS (0 g Intravenous Stopped 10/24/24 2008)   HYDROmorphone (PF) (DILAUDID) injection 0.3 mg (0.3 mg Intravenous $Given 10/24/24 1841)   CT scan flush (55 mLs Intravenous $Given 10/24/24 1854)   iopamidol (ISOVUE-370) solution 500 mL (59 mLs Intravenous $Given 10/24/24 1854)   ketorolac (TORADOL) injection 15 mg (15 mg Intravenous $Given 10/24/24 2008)   midazolam (VERSED) injection 0.5 mg (0.5 mg Intravenous $Given  10/24/24 2007)       Drips infusing:  No  For the majority of the shift this patient was Green.   Interventions performed were N/A.    Sepsis treatment initiated: No    Cares/treatment/interventions/medications to be completed following ED care: N/A    ED Nurse Name: Ruby Bran RN  8:12 PM    RECEIVING UNIT ED HANDOFF REVIEW    Above ED Nurse Handoff Report was reviewed: Yes  Reviewed by: Char Mello RN on October 25, 2024 at 2:38 AM   I Tina called the ED to inform them the note was read: Yes

## 2024-10-25 NOTE — H&P
[facility]    History and Physical - Hospitalist Service       Date of Admission:  10/24/2024    Assessment & Plan      Maria Esther Miller is a 74 year old female with PMH significant for recurrent SBO (last 2010), melanoma, MDD, JEANNA, breast cancer, prior bowel perf s/p colectomy admitted on 10/24/2024 with abdominal pain and vomiting.     Complete closed loop SBO  Abdominal pain & vomiting:  Prior hysterectomy --> large bowel obstruction s/p bowel resection --> recurrent small bowel obstructions.  Now presents with severe abdominal pain.  Intractable vomiting.  CT a/p shows acute small bowel obstruction with multiple adjacent transition points in the left midabdomen concerning for closed loop SBO.   -General surgery consulted   -Discussed immediate surgery vs conservative managements with patient   -Have opted for conservative management  -NGT in place to suction   -NPO  -MIVF with LR at 75 ml/hr  -Pain control with IV dilaudid    UTI:  Urinalysis at urgent care shows positive nitrites.  Given ceftriaxone in the ED.  Will continue.  Follow urine culture.     AGMA: AG 18, HCO3 21.  Obvious consideration would be for ischemic bowel but lactic acid normal.  Check ketones.  MIVF and recheck in the morning    Leukocytosis:  May be related to stress demargination in the setting of SBO or UTI.  Treatment as above.  Recheck CBC in AM    Will resume PTA meds once med rec completed.             Diet: NPO for Medical/Clinical Reasons Except for: Meds    DVT Prophylaxis: Pneumatic Compression Devices  Mark Catheter: Not present  Lines: None     Cardiac Monitoring: None  Code Status:  FULL    Clinically Significant Risk Factors Present on Admission                                      Disposition Plan     Medically Ready for Discharge: Anticipated in 2-4 Days           Stuart Alonzo DO  Hospitalist Service  [facility]  Securely message with [employer] (more info)  Text page via [employer]  Paging/Directory     ______________________________________________________________________    Chief Complaint   Abdominal pain, vomiting    History is obtained from the patient    History of Present Illness   Maria Esther Miller is a 74 year old female with PMH significant for recurrent SBO (last 2010), melanoma, MDD, JEANNA, breast cancer, prior bowel perf s/p colectomy admitted on 10/24/2024 with abdominal pain and vomiting.     Recurrent history of bowel obstruction after hysterectomy.  Prior colectomy with reanastamosis.  Here with severe abdominal pain with intractable vomiting for 24 hours.  Last small BM this AM.  Last SBO in 2010.  No bloody emesis or stool       Past Medical History    SBO  LBO s/p partial colectomy    Past Surgical History   Colectomy  Hysterectomy    Prior to Admission Medications   Prior to Admission Medications   Prescriptions Last Dose Informant Patient Reported? Taking?   BIOTIN PO   Yes No   Sig: Take 1 tablet by mouth daily.   MULTIPLE VITAMINS PO   Yes No   Sig: Take 1 tablet by mouth daily.   Omega-3 Fatty Acids (FISH OIL) 500 MG CAPS   Yes No   Sig: Take 2 capsules by mouth 2 times daily.   Vitamin E (TOCOPHEROL) 1000 UNIT capsule   Yes No   Sig: Take 1,000 Units by mouth daily. As needed.   buPROPion (WELLBUTRIN SR) 150 MG 12 hr tablet   Yes No   Sig: Take  by mouth. Takes two tabs in PM and one tab in PM.   estradiol (ESTRACE) 1 MG tablet   Yes No   Sig: Take 1 mg by mouth daily.   gabapentin (NEURONTIN) 300 MG capsule   Yes No   Sig: Take 900 mg by mouth At Bedtime.   lorazepam (ATIVAN) 0.5 MG tablet   Yes No   Sig: Take 0.5 mg by mouth. Takes one in the morning, one at noon as needed and two at bedtime.      Facility-Administered Medications: None        Review of Systems    The 10 point Review of Systems is negative other than noted in the HPI or here.     Social History   I have reviewed this patient's social history and updated it with pertinent information if needed.  Social  History     Tobacco Use    Smoking status: Never    Smokeless tobacco: Never   Substance Use Topics    Alcohol use: Yes     Comment: socially         Family History   Patient did not report family history      Allergies   Allergies   Allergen Reactions    Darvocet [Propoxyphene N-Apap] Other (See Comments)     Constipation per patient.        Physical Exam   Vital Signs: Temp: 98.6  F (37  C) Temp src: Temporal BP: 97/52 Pulse: 80   Resp: 18 SpO2: 99 % O2 Device: None (Room air)    Weight: 116 lbs 2.92 oz    General Appearance: Patient awake & alert.  No apparent distress.   Respiratory: Lungs are CTAB.  Work of breathing appears normal on room air.  Cardiovascular: Regular rate and rhythm.  No murmurs rubs or gallops.  There is no edema present.  GI: Benign.  Soft.  Mild diffuse tenderness.  Mild distension.  Bowel sounds active.   Skin: No rashes or lesions exposed skin.  Neuro:  The patient is moving all extremities.  No obvious focal asymmetries.   Other: Patient is appropriate and oriented x3.  NGT in place    Medical Decision Making       75 MINUTES SPENT BY ME on the date of service doing chart review, history, exam, documentation & further activities per the note.      Data   ------------------------- PAST 24 HR DATA REVIEWED -----------------------------------------------    I have personally reviewed the following data over the past 24 hrs:    11.4 (H)  \   15.2   / 298     138 99 14.3 /  120 (H)   4.3 21 (L) 0.82 \     ALT: 27 AST: 43 AP: 80 TBILI: 0.4   ALB: 4.7 TOT PROTEIN: 7.6 LIPASE: 26     Procal: N/A CRP: N/A Lactic Acid: 0.9         Imaging results reviewed over the past 24 hrs:   Recent Results (from the past 24 hours)   XR Abdomen 1 View    Narrative    COMPARISON:  None.    FINDINGS:  Supine positioning, 2 images. There is a large amount of stool in the colon. There is an anastomotic line and surgical clips in the pelvis. There are dilated loops of bowel centrally felt to represent dilated loops  of small bowel.    Impression    The dilated loops of small bowel would be concerning for a bowel obstruction.   CT Abdomen Pelvis w Contrast    Narrative    EXAM: CT ABDOMEN PELVIS W CONTRAST  LOCATION: [facility]  DATE: 10/24/2024    INDICATION: abdominal pain  COMPARISON: 12/15/2011  TECHNIQUE: CT scan of the abdomen and pelvis was performed following injection of IV contrast. Multiplanar reformats were obtained. Dose reduction techniques were used.  CONTRAST: 59mL Isovue 370    FINDINGS:   LOWER CHEST: Normal.    HEPATOBILIARY: Normal.    PANCREAS: Normal.    SPLEEN: Normal.    ADRENAL GLANDS: Normal.    KIDNEYS/BLADDER: No significant mass, stone, or hydronephrosis. Circumferential urinary bladder wall thickening.    BOWEL: Dilated proximal small bowel up to 3.5 cm with multiple adjacent transition points in the left midabdomen (e.g. 3/92, 99, 102). Small amount of free fluid in the pelvis. No extraluminal gas, pneumatosis, or fluid collection. Which is now primarily   ML and    LYMPH NODES: Normal.    VASCULATURE: Normal.    PELVIC ORGANS: Normal.    MUSCULOSKELETAL: Normal.      Impression    IMPRESSION:   1.  Acute small bowel obstruction with multiple adjacent transition points in the left midabdomen resulting in a portion which has a closed loop morphology. No evidence of perforation or ischemia.  2.  Circumferential urinary bladder wall thickening suggesting infectious or inflammatory cystitis. Correlate with urinalysis.    Dr. Carey Kim was contacted by me on 10/24/2024 7:25 PM CDT and verbalized understanding of the result.     Abdomen XR 1 vw    Narrative    EXAM: XR ABDOMEN 1 VIEW  LOCATION: [facility]  DATE: 10/24/2024    INDICATION: Tube placement   COMPARISON: None.      Impression    IMPRESSION: NG tube tip and sidehole in the stomach. Urinary excretion of IV contrast. Gaseous dilation of a few loops of proximal small bowel, suggesting mechanical  small bowel obstruction. Pelvic surgical clips.

## 2024-10-25 NOTE — OP NOTE
[facility], [place] General Surgery Operative Note    Pre-operative diagnosis: Small bowel obstruction    Post-operative diagnosis: same   Procedure: 1.) exploratory laparotomy with extensive lysis of adhesions    Surgeon: Kyle Fofana MD   Assistant(s): Jayme Carl PA-C  The Physician Assistant was medically necessary for their expertise in prepping, camera management, suctioning, suturing and retraction.   Anesthesia: General and local anesthesia with 0.5% marcaine and epinephrine     Estimated blood loss:  Specimen: 10 cc  None                INDICATION FOR OPERATION:   Ms Miller is a 75 yo female with an extensive abdominal surgical history who is admitted with a small bowel obstruction. She has been monitored in the hospital overnight but has failed non-operative management. We discussed proceeding to surgery to definitive management of the bowel obstruction and she agreed to proceed. We discussed risks of surgery including bleeding, infection, injury to adjacent structures, need for bowel resection, anastomotic leak, and risks of anesthesia.     DESCRIPTION OF PROCEDURE:  The patient was taken to the operating room and placed on the table in supine position.  General endotracheal anesthesia was induced and the abdomen was prepped and draped in standard sterile fashion. A moderate midline laparotomy incision was made with a skin knife. The incision was carried into the subcutaneous fat and down to the fascia. The fascia was lifted and incised in the epigastrium in [place]. The peritoneum was incised and we entered the abdominal cavity. We extended the fascial incision a few centimeters inferiorly and soon encountered several loops of bowel adherent to the midline and anterior abdominal wall. We then spent several minutes lysing adhesions off the anterior abdominal wall and then extending our incision and again lysing adhesions and further extending our incision until I felt our incision was large  enough to easily begin eviscerating the bowel. Once we had our incision to appropriate length we did notice dilated bowel in the left upper quadrant with decompressed bowel further downstream. We began eviscerating the bowel. There were several dense adhesions between loops of bowel, omentum, and the abdominal wall. We spent several minutes lysing these adhesions. While working on adhesiolysis and eviscerating the bowel we did encounter a tight band causing a complete bowel obstruction that was lysed with good relief of the obstruction. There was concern for a closed loop obstruction on CT scan although this was not appreciated at this time intraoperatively. To ensure no other obstructing bands we then spend several additional minutes lysing dense adhesions between the small bowel and freed the bowel up to the terminal ileum. There were some broad based bands around the terminal ileum and the right colon and we left these alone and there was no sign these were causing an obstruction. At this point I felt we have performed the necessary adhesiolysis and the obstructing band had been divided. There was no evidence of a closed loop though and all the bowel appeared viable. We then ran the bowel from the ligament of treitz two more times and repaired a few small serosal tears which were inherent to the operation given the dense adhesions with interrupted 3-0 Vicryl Lembert sutures. The bowel was then placed back into the abdominal cavity. The abdomen was the irrigated with saline. Hemostasis was assured. Seprafilm was placed beneath the fascia. The fascia was then closed with running looped 0-PDS sutures. The subcutaneous tissues were irrigated with saline and the skin was closed with staples. Sterile bandages were then applied.  The patient was then woken, extubated, and transported to the Postanesthesia Care Unit in satisfactory condition. Sponge and needle counts were correct on two counts at the conclusion of the  procedure.     FINDINGS:   1.) dense intra-abdominal adhesions with a adhesive band causing a complete obstruction in the LUQ of the abdomen. No evidence of a closed loop. The bowel all appeared viable.     Kyle Fofana MD

## 2024-10-25 NOTE — PLAN OF CARE
ROOM # 226    Living Situation Ind  Facility name:  : [facility]    Activity level at baseline: Ind  Activity level on admit: SBA    Who will be transporting you at discharge: TBD    Patient registered to observation; given Patient Bill of Rights; given the opportunity to ask questions about observation status and their plan of care.  Patient has been oriented to the observation room, bathroom and call light is in place.    Discussed discharge goals and expectations with patient/family.

## 2024-10-25 NOTE — PLAN OF CARE
Patient transferred to PACU around 0945. Potentially may be transferred to another floor after surgery

## 2024-10-25 NOTE — BRIEF OP NOTE
[facility]    Brief Operative Note    Pre-operative diagnosis: Small bowel obstruction (H) [K56.609]  Post-operative diagnosis Same as pre-operative diagnosis    Procedure: LAPAROTOMY, EXPLORATORY, WITH LYSIS OF ADHESIONS, N/A - Abdomen    Surgeon: Surgeons and Role:     * Kyle Fofana MD - Primary     * Jayme Carl PA-C - Assisting  Anesthesia: General   Estimated Blood Loss: 10 mL from 10/25/2024 10:13 AM to 10/25/2024 12:13 PM      Drains: None  Specimens: * No specimens in log *  Findings:   Dense adhesions throughout the abdomen, adhesions in the left upper abdomen causing an acute small bowel obstruction with good relief of the obstruction after lysis . All bowel appeared viable.   Complications: None.  Implants: * No implants in log *    -continue NGT to LIS   -figueroa out POD#1

## 2024-10-25 NOTE — PLAN OF CARE
"[id number]    Inpatient Progress Note:    BP (!) 149/76 (BP Location: Right arm)   Pulse 67   Temp 98.6  F (37  C) (Oral)   Resp 18   Ht 1.6 m (5' 3\")   Wt 52.2 kg (115 lb 1.6 oz)   SpO2 96%   BMI 20.39 kg/m         Orientation: A&ox4, pt is anxious at times. [place]- bilateral hearing aids  Pain status: 8/10 pain in abd, managed w IV dilauidid  Activity: SBA  Resp: WDL, denies SOB  Cardiac: WDL, denies chest pain  GI: X, abd pain- SOB- NG tube  :  WDL, monitor outpt  Skin: WDL  LDA: NG tube- low inter suction  Infusions: D5 LR +Kcl @ 75 ml/hr  Pertinent Labs: K and mag protocol.  Diet: NPO  Consults: Surgery  Discharge Plan: TBD- gastrografin protocol- X-ray around 7:15am    Will continue to monitor and provide cares.     Char Mello RN       Problem: Adult Inpatient Plan of Care  Goal: Plan of Care Review  Description: The Plan of Care Review/Shift note should be completed every shift.  The Outcome Evaluation is a brief statement about your assessment that the patient is improving, declining, or no change.  This information will be displayed automatically on your shift  note.  Outcome: Progressing  Flowsheets (Taken 10/25/2024 0356)  Outcome Evaluation: A&Ox4, SBA, NG on low int suction. Pain managed w IV dilauidid. Surgery following. NPO. Fluids running  Plan of Care Reviewed With: patient  Overall Patient Progress: no change  Goal: Patient-Specific Goal (Individualized)  Description: You can add care plan individualizations to a care plan. Examples of Individualization might be:  \"Parent requests to be called daily at 9am for status\", \"I have a hard time hearing out of my right ear\", or \"Do not touch me to wake me up as it startles  me\".  Outcome: Progressing  Goal: Absence of Hospital-Acquired Illness or Injury  Outcome: Progressing  Intervention: Identify and Manage Fall Risk  Recent Flowsheet Documentation  Taken 10/25/2024 0324 by Char Mello RN  Safety Promotion/Fall Prevention:   activity " supervised   clutter free environment maintained   lighting adjusted   mobility aid in reach   nonskid shoes/slippers when out of bed  Goal: Optimal Comfort and Wellbeing  Outcome: Progressing  Intervention: Monitor Pain and Promote Comfort  Recent Flowsheet Documentation  Taken 10/25/2024 0336 by Char Mello RN  Pain Management Interventions: medication (see MAR)  Goal: Readiness for Transition of Care  Outcome: Progressing  Intervention: Mutually Develop Transition Plan  Recent Flowsheet Documentation  Taken 10/25/2024 0321 by Char Mello RN  Equipment Currently Used at Home: none     Problem: Intestinal Obstruction  Goal: Optimal Bowel Function  Outcome: Progressing  Goal: Fluid and Electrolyte Balance  Outcome: Progressing  Goal: Absence of Infection Signs and Symptoms  Outcome: Progressing  Goal: Optimize Nutrition Status  Outcome: Progressing  Goal: Optimal Pain Control and Function  Outcome: Progressing  Intervention: Prevent or Manage Pain  Recent Flowsheet Documentation  Taken 10/25/2024 0336 by Char Mello RN  Pain Management Interventions: medication (see MAR)     Problem: Pain Acute  Goal: Optimal Pain Control and Function  Outcome: Progressing  Intervention: Develop Pain Management Plan  Recent Flowsheet Documentation  Taken 10/25/2024 0336 by Char Mello RN  Pain Management Interventions: medication (see MAR)  Intervention: Prevent or Manage Pain  Recent Flowsheet Documentation  Taken 10/25/2024 0325 by Char Mello RN  Medication Review/Management: medications reviewed   Goal Outcome Evaluation:      Plan of Care Reviewed With: patient    Overall Patient Progress: no changeOverall Patient Progress: no change    Outcome Evaluation: A&Ox4, SBA, NG on low int suction. Pain managed w IV dilauidid. Surgery following. NPO. Fluids running

## 2024-10-25 NOTE — PROGRESS NOTES
[facility]    Medicine Progress Note - Hospitalist Service    Date of Admission:  10/24/2024    Assessment & Plan      Maria Esther Miller is a 74 year old female with PMH significant for recurrent SBO (last 2010), melanoma, MDD, JEANNA, breast cancer, prior bowel perf s/p colectomy admitted on 10/24/2024 with abdominal pain and vomiting.     Complete closed loop SBO  Abdominal pain & vomiting:  Prior hysterectomy --> large bowel obstruction s/p bowel resection --> recurrent small bowel obstructions.  Now presents with severe abdominal pain.  Intractable vomiting.  CT a/p shows acute small bowel obstruction with multiple adjacent transition points in the left midabdomen concerning for closed loop SBO.   - General surgery consulted, pt opted for conservative management.   - gastrograffin challenge done, no contrast seen in colon on follow up XR  - Surgery recommended exploratory laparotomy  - s/p ex lap with lysis of adhesions  - continue NG tube to low intermittent suction   - NPO per surgery  - continue D5 in LR + KCl at 75 ml/hr  - Pain control with IV dilaudid    UTI:  Urinalysis at urgent care shows positive nitrites.  Given ceftriaxone in the ED.  Will continue.  Follow urine culture in care everywhere.     AGMA: AG 18, HCO3 21.  Obvious consideration would be for ischemic bowel but lactic acid normal.  Check ketones.  - resolved  - no evidence of ischemic bowel in OR    Leukocytosis:  May be related to stress demargination in the setting of SBO or UTI.  Treatment as above.  Recheck CBC in AM          Diet: NPO for Medical/Clinical Reasons Except for: Ice Chips, Meds, Other; Specify: popsicles for comfort ok    DVT Prophylaxis: Pneumatic Compression Devices  Mark Catheter: PRESENT, indication: ?  (Error. Value could not be saved.), Surgical procedure  Lines: None     Cardiac Monitoring: None  Code Status: Full Code      Clinically Significant Risk Factors Present on Admission                                       Disposition Plan     Medically Ready for Discharge: Anticipated in 2-4 Days           The patient's care was discussed with the Bedside Nurse, Patient, Patient's Family, and surgery Consultant(s).    Brenda Soto PA-C  Hospitalist Service  [facility]  Securely message with [employer] (more info)  Text page via [facility] Paging/Directory   ______________________________________________________________________    Interval History   Seen post op, mildly sedated. Notes exquisite tenderness earlier today.     Physical Exam   Vital Signs: Temp: 98.8  F (37.1  C) Temp src: Oral BP: 109/70 Pulse: 63   Resp: 20 SpO2: 96 % O2 Device: Nasal cannula Oxygen Delivery: 2 LPM  Weight: 115 lbs 1.6 oz    GENERAL:  Comfortable.  PSYCH: pleasant, oriented, No acute distress.  HEART:  RRR  LUNGS:  Normal Respiratory effort.   GI:  Soft, non distended.   EXTREMITIES:  able to move all extremities  NEUROLOGIC:  grossly intact.      Medical Decision Making       45 MINUTES SPENT BY ME on the date of service doing chart review, history, exam, documentation & further activities per the note.      Data     I have personally reviewed the following data over the past 24 hrs:    13.9 (H)  \   14.3   / 271     141 104 18.2 /  138 (H)   3.9 25 0.88 \     ALT: N/A AST: N/A AP: N/A TBILI: N/A   ALB: N/A TOT PROTEIN: N/A LIPASE: N/A     Procal: N/A CRP: N/A Lactic Acid: 0.9         Imaging results reviewed over the past 24 hrs:   Recent Results (from the past 24 hours)   CT Abdomen Pelvis w Contrast    Narrative    EXAM: CT ABDOMEN PELVIS W CONTRAST  LOCATION: [facility]  DATE: 10/24/2024    INDICATION: abdominal pain  COMPARISON: 12/15/2011  TECHNIQUE: CT scan of the abdomen and pelvis was performed following injection of IV contrast. Multiplanar reformats were obtained. Dose reduction techniques were used.  CONTRAST: 59mL Isovue 370    FINDINGS:   LOWER CHEST: Normal.    HEPATOBILIARY:  Normal.    PANCREAS: Normal.    SPLEEN: Normal.    ADRENAL GLANDS: Normal.    KIDNEYS/BLADDER: No significant mass, stone, or hydronephrosis. Circumferential urinary bladder wall thickening.    BOWEL: Dilated proximal small bowel up to 3.5 cm with multiple adjacent transition points in the left midabdomen (e.g. 3/92, 99, 102). Small amount of free fluid in the pelvis. No extraluminal gas, pneumatosis, or fluid collection. Which is now primarily   ML and    LYMPH NODES: Normal.    VASCULATURE: Normal.    PELVIC ORGANS: Normal.    MUSCULOSKELETAL: Normal.      Impression    IMPRESSION:   1.  Acute small bowel obstruction with multiple adjacent transition points in the left midabdomen resulting in a portion which has a closed loop morphology. No evidence of perforation or ischemia.  2.  Circumferential urinary bladder wall thickening suggesting infectious or inflammatory cystitis. Correlate with urinalysis.    Dr. Carey Kim was contacted by me on 10/24/2024 7:25 PM CDT and verbalized understanding of the result.     Abdomen XR 1 vw    Narrative    EXAM: XR ABDOMEN 1 VIEW  LOCATION: [facility]  DATE: 10/24/2024    INDICATION: Tube placement   COMPARISON: None.      Impression    IMPRESSION: NG tube tip and sidehole in the stomach. Urinary excretion of IV contrast. Gaseous dilation of a few loops of proximal small bowel, suggesting mechanical small bowel obstruction. Pelvic surgical clips.    XR Gastrografin Challenge    Narrative    GASTROGRAFIN CHALLENGE  10/25/2024 7:38 AM     HISTORY: Small bowel obstruction.    COMPARISON: October 24, 2024      Impression    IMPRESSION: Contrast present in the stomach and proximal small bowel,  dilated small bowel present. No contrast present in the colon.      APRIL PENA MD         SYSTEM ID:  [id number]

## 2024-10-25 NOTE — PLAN OF CARE
"Goal Outcome Evaluation:  wBP 121/76   Pulse 99   Temp 98.7  F (37.1  C) (Temporal)   Resp 12   Ht 1.6 m (5' 3\")   Wt 52.2 kg (115 lb 1.6 oz)   SpO2 98%   BMI 20.39 kg/m    Neuro: ax04, able to make needs known, pt is hard of hearing and wears hearing aids.  Cardiac: wdl  Lungs: wdl  GI: pt has no bowels sounds in all 4 quadrants due to SBO, pt has a NG tube on low intermittent suction. With 300mls output, pt was taken to PACU 0945 for surgery following her abnormal x-ray results.  : wdl  Pain: pt's pain was managed with prn dilaudid 0.4mg iv push   IV: pt has a PIV to her R upper form arm WDL  Meds: N/A  Labs/tests: abdnormal x-ray results showing GI obstruction   Diet: NPO due to possible surgery  Activity: SBA  Misc: N/A  Plan: manage NG tube, pain level, and monitor s/s of worsening SBO    Will continue to monitor and provide cares.        Plan of Care Reviewed With: patient    Overall Patient Progress: improvingOverall Patient Progress: improving    Outcome Evaluation: pt care from [id number],      "

## 2024-10-26 LAB
ANION GAP SERPL CALCULATED.3IONS-SCNC: 9 MMOL/L (ref 7–15)
BUN SERPL-MCNC: 15.8 MG/DL (ref 8–23)
CALCIUM SERPL-MCNC: 8.6 MG/DL (ref 8.8–10.4)
CHLORIDE SERPL-SCNC: 111 MMOL/L (ref 98–107)
CREAT SERPL-MCNC: 0.82 MG/DL (ref 0.51–0.95)
EGFRCR SERPLBLD CKD-EPI 2021: 76 ML/MIN/1.73M2
ERYTHROCYTE [DISTWIDTH] IN BLOOD BY AUTOMATED COUNT: 13.3 % (ref 10–15)
GLUCOSE SERPL-MCNC: 120 MG/DL (ref 70–99)
HCO3 SERPL-SCNC: 25 MMOL/L (ref 22–29)
HCT VFR BLD AUTO: 41.1 % (ref 35–47)
HGB BLD-MCNC: 13 G/DL (ref 11.7–15.7)
MAGNESIUM SERPL-MCNC: 2.1 MG/DL (ref 1.7–2.3)
MCH RBC QN AUTO: 31.3 PG (ref 26.5–33)
MCHC RBC AUTO-ENTMCNC: 31.6 G/DL (ref 31.5–36.5)
MCV RBC AUTO: 99 FL (ref 78–100)
PLATELET # BLD AUTO: 214 10E3/UL (ref 150–450)
POTASSIUM SERPL-SCNC: 5.2 MMOL/L (ref 3.4–5.3)
RBC # BLD AUTO: 4.16 10E6/UL (ref 3.8–5.2)
SODIUM SERPL-SCNC: 145 MMOL/L (ref 135–145)
WBC # BLD AUTO: 8.4 10E3/UL (ref 4–11)

## 2024-10-26 PROCEDURE — 250N000011 HC RX IP 250 OP 636: Performed by: STUDENT IN AN ORGANIZED HEALTH CARE EDUCATION/TRAINING PROGRAM

## 2024-10-26 PROCEDURE — 250N000013 HC RX MED GY IP 250 OP 250 PS 637: Performed by: PHYSICIAN ASSISTANT

## 2024-10-26 PROCEDURE — 83735 ASSAY OF MAGNESIUM: CPT | Performed by: STUDENT IN AN ORGANIZED HEALTH CARE EDUCATION/TRAINING PROGRAM

## 2024-10-26 PROCEDURE — 82947 ASSAY GLUCOSE BLOOD QUANT: CPT | Performed by: PHYSICIAN ASSISTANT

## 2024-10-26 PROCEDURE — 99233 SBSQ HOSP IP/OBS HIGH 50: CPT | Performed by: INTERNAL MEDICINE

## 2024-10-26 PROCEDURE — 85014 HEMATOCRIT: CPT | Performed by: PHYSICIAN ASSISTANT

## 2024-10-26 PROCEDURE — 36415 COLL VENOUS BLD VENIPUNCTURE: CPT | Performed by: PHYSICIAN ASSISTANT

## 2024-10-26 PROCEDURE — 250N000013 HC RX MED GY IP 250 OP 250 PS 637: Performed by: STUDENT IN AN ORGANIZED HEALTH CARE EDUCATION/TRAINING PROGRAM

## 2024-10-26 PROCEDURE — 258N000001 HC RX 258: Performed by: STUDENT IN AN ORGANIZED HEALTH CARE EDUCATION/TRAINING PROGRAM

## 2024-10-26 PROCEDURE — 80048 BASIC METABOLIC PNL TOTAL CA: CPT | Performed by: PHYSICIAN ASSISTANT

## 2024-10-26 PROCEDURE — 120N000001 HC R&B MED SURG/OB

## 2024-10-26 PROCEDURE — 250N000011 HC RX IP 250 OP 636: Performed by: INTERNAL MEDICINE

## 2024-10-26 RX ORDER — CEFTRIAXONE 1 G/1
1 INJECTION, POWDER, FOR SOLUTION INTRAMUSCULAR; INTRAVENOUS EVERY 24 HOURS
Status: COMPLETED | OUTPATIENT
Start: 2024-10-26 | End: 2024-10-28

## 2024-10-26 RX ADMIN — GABAPENTIN 900 MG: 300 CAPSULE ORAL at 21:22

## 2024-10-26 RX ADMIN — SERTRALINE 100 MG: 100 TABLET, FILM COATED ORAL at 10:17

## 2024-10-26 RX ADMIN — FAMOTIDINE 20 MG: 10 INJECTION, SOLUTION INTRAVENOUS at 10:16

## 2024-10-26 RX ADMIN — LORAZEPAM 0.5 MG: 0.5 TABLET ORAL at 21:22

## 2024-10-26 RX ADMIN — BUPROPION HYDROCHLORIDE 150 MG: 150 TABLET, FILM COATED, EXTENDED RELEASE ORAL at 19:06

## 2024-10-26 RX ADMIN — CEFTRIAXONE 1 G: 1 INJECTION, POWDER, FOR SOLUTION INTRAMUSCULAR; INTRAVENOUS at 17:11

## 2024-10-26 RX ADMIN — BUPROPION HYDROCHLORIDE 300 MG: 150 TABLET, FILM COATED, EXTENDED RELEASE ORAL at 10:17

## 2024-10-26 RX ADMIN — HYDROMORPHONE HYDROCHLORIDE 0.2 MG: 0.2 INJECTION, SOLUTION INTRAMUSCULAR; INTRAVENOUS; SUBCUTANEOUS at 16:37

## 2024-10-26 RX ADMIN — DEXTROSE MONOHYDRATE, SODIUM CHLORIDE, SODIUM LACTATE, POTASSIUM CHLORIDE, CALCIUM CHLORIDE: 5; 600; 310; 179; 20 INJECTION, SOLUTION INTRAVENOUS at 06:41

## 2024-10-26 RX ADMIN — FAMOTIDINE 20 MG: 10 INJECTION, SOLUTION INTRAVENOUS at 21:22

## 2024-10-26 NOTE — PLAN OF CARE
"Pertinent assessments: A&O, Up ind now. Denies pain or nausea. Hypoactive BS, not passing gas, last bm 10/24. Tolerating ice chips and popsicles, pt aware to take it slow with intake. IVF at 75.     Major Shift Events: Capno, Ng and Mark removed. Walked hallways ind.     Treatment Plan: Awaiting tolerance of diet and return of normal bowel function     Bedside Nurse: Faye Farrell RN       Problem: Adult Inpatient Plan of Care  Goal: Plan of Care Review  Description: The Plan of Care Review/Shift note should be completed every shift.  The Outcome Evaluation is a brief statement about your assessment that the patient is improving, declining, or no change.  This information will be displayed automatically on your shift  note.  Outcome: Progressing  Flowsheets (Taken 10/26/2024 1331)  Outcome Evaluation: Treatment Plan: Awaiting tolerance of diet and return of normal bowel function  Plan of Care Reviewed With: patient  Overall Patient Progress: improving  Goal: Patient-Specific Goal (Individualized)  Description: You can add care plan individualizations to a care plan. Examples of Individualization might be:  \"Parent requests to be called daily at 9am for status\", \"I have a hard time hearing out of my right ear\", or \"Do not touch me to wake me up as it startles  me\".  Outcome: Progressing  Goal: Absence of Hospital-Acquired Illness or Injury  Outcome: Progressing  Intervention: Identify and Manage Fall Risk  Recent Flowsheet Documentation  Taken 10/26/2024 1000 by Faye Farrell RN  Safety Promotion/Fall Prevention: activity supervised  Intervention: Prevent and Manage VTE (Venous Thromboembolism) Risk  Recent Flowsheet Documentation  Taken 10/26/2024 1000 by Faye Farrell RN  VTE Prevention/Management: SCDs off (sequential compression devices)  Intervention: Prevent Infection  Recent Flowsheet Documentation  Taken 10/26/2024 1000 by Faye Farrell RN  Infection Prevention: rest/sleep " promoted  Goal: Optimal Comfort and Wellbeing  Outcome: Progressing  Goal: Readiness for Transition of Care  Outcome: Progressing     Problem: Intestinal Obstruction  Goal: Optimal Bowel Function  Outcome: Progressing  Goal: Fluid and Electrolyte Balance  Outcome: Progressing  Goal: Absence of Infection Signs and Symptoms  Outcome: Progressing  Goal: Optimize Nutrition Status  Outcome: Progressing  Goal: Optimal Pain Control and Function  Outcome: Progressing     Problem: Pain Acute  Goal: Optimal Pain Control and Function  Outcome: Progressing  Intervention: Prevent or Manage Pain  Recent Flowsheet Documentation  Taken 10/26/2024 1000 by Faye Farrell RN  Medication Review/Management: medications reviewed   Goal Outcome Evaluation:      Plan of Care Reviewed With: patient    Overall Patient Progress: improvingOverall Patient Progress: improving    Outcome Evaluation: Treatment Plan: Awaiting tolerance of diet and return of normal bowel function

## 2024-10-26 NOTE — PROGRESS NOTES
[facility], [place]    Medicine Progress Note - Hospitalist Service    Date of Admission:  10/24/2024    Assessment & Plan   Maria Esther Miller is a 74 year old female with PMH significant for recurrent SBO (last 2010), melanoma, MDD, JEANNA, breast cancer, prior bowel perf s/p colectomy admitted on 10/24/2024 with abdominal pain and vomiting.     Complete closed loop SBO  Abdominal pain & vomiting:  Postoperative state  Status post ex lap with extensive lysis of adhesions last 10/25/2024    Prior hysterectomy --> large bowel obstruction s/p bowel resection --> recurrent small bowel obstructions.  Now presents with severe abdominal pain.  Intractable vomiting.  CT a/p shows acute small bowel obstruction with multiple adjacent transition points in the left midabdomen concerning for closed loop SBO.   -Highly appreciate continuous and extensive input from general surgery service  -Still has NG tube and n.p.o.  Will defer further instructions from general surgery service  -No complaints of any uncontrolled pain  -Ambulate as much as she can  -Minimize narcotics as able  -On IV fluid support    UTI:  Urinalysis at urgent care shows positive nitrites.  Given ceftriaxone in the ED.  Will continue.  Follow urine culture in care everywhere.   -Received IV ceftriaxone x 2 days now  -Urinalysis here showing no clear evidence of infection however chart review stated that patient has UTI that was diagnosed on urine analysis prior to this hospitalization  -There is no available data in Care Everywhere  -Intention is to finish course of antibiotics at least for 5 days    AGMA: AG 18, HCO3 21.  Obvious consideration would be for ischemic bowel but lactic acid normal.  Check ketones.  - resolved  - no evidence of ischemic bowel in OR    Leukocytosis: This has resolved.        Diet: NPO for Medical/Clinical Reasons Except for: Ice Chips, Meds, Other; Specify: popsicles for comfort ok    DVT Prophylaxis: Pneumatic Compression  Devices and Ambulate every shift  Mark Catheter: PRESENT, indication: ?  (Error. Value could not be saved.), Surgical procedure  Lines: None     Cardiac Monitoring: None  Code Status: Full Code      Clinically Significant Risk Factors          # Hyperchloremia: Highest Cl = 111 mmol/L in last 2 days, will monitor as appropriate      # Hypocalcemia: Lowest Ca = 8.6 mg/dL in last 2 days, will monitor and replace as appropriate                              Disposition Plan     Medically Ready for Discharge: Anticipated in 2-4 Days             Luke Lea MD, MD  Hospitalist Service  [facility]  Securely message with [employer] (more info)  Text page via [facility] Paging/Directory   ______________________________________________________________________    Interval History   I assumed medicine service care today.  Chart reviewed.  Seen and examined.  Case discussed with nursing service.  I met this a pleasant lady this morning while she is laying comfortably in bed and appears to be in good spirits as she had a decent sleep overnight.  She is endorsing no ongoing abdominal pain or nausea or vomiting.  Able to tolerate some of her popsicle.  Underwent surgical intervention yesterday and tolerated the procedure well.  Currently not requiring oxygen support.  Afebrile.  No mental status changes.  No passing of flatus yet.  Denies any abdominal pain.  No reports of any chest pain or shortness of breath.    She is looking forward for increasing ambulation and physical activity today.    Physical Exam   Vital Signs: Temp: 98.8  F (37.1  C) Temp src: Oral BP: 108/60 Pulse: 76   Resp: 18 SpO2: 99 % O2 Device: Nasal cannula Oxygen Delivery: 1 LPM  Weight: 115 lbs 1.6 oz    HEENT; Atraumatic, normocephalic, pinkish conjuctiva, pupils bilateral reactive   Skin: warm and moist, no rashes  NG tube present   indwelling Mark catheter seen    Lungs: equal chest expansion, clear to auscultation, no wheezes, no  stridor, no crackles,   Heart: normal rate, normal rhythm, no rubs or gallops.   Abdomen: normal bowel sounds, no tenderness, no peritoneal signs, no guarding  Extremities: no deformities, no edema   Neuro; follow commands, alert and oriented x3, spontaneous speech, coherent, moves all extremities spontaneously  Psych; no hallucination, euthymic mood, not agitated      Medical Decision Making       Yes 50 MINUTES SPENT BY ME on the date of service doing chart review, history, exam, documentation & further activities per the note.  MANAGEMENT DISCUSSED with the following over the past 24 hours: yes   NOTE(S)/MEDICAL RECORDS REVIEWED over the past 24 hours: Yes       Data     I have personally reviewed the following data over the past 24 hrs:    8.4  \   13.0   / 214     145 111 (H) 15.8 /  120 (H)   5.2 25 0.82 \       Imaging results reviewed over the past 24 hrs:   No results found for this or any previous visit (from the past 24 hours).

## 2024-10-26 NOTE — PROGRESS NOTES
"[facility]   General Surgery Progress Note          Assessment and Plan:   Assessment:   POD#1 s/p exploratory laparotomy and extensive lysis of adhesions for SBO  Postoperative ileus as expected       Plan:   -NPO, ok for sips/ice chips and occasional popsicle until bowel function returns  -IV ABX: Rocephin q24h x 3d for UTI  -IV fluids: LR 75ml/hr  -Increase activity as tolerated, walk halls 2-3 times per day  -Hospitalist managing medically  -Await return of bowel function         Interval History:   Sitting up in bed, doing well. Denies pain, has some soreness as expected. NG tube was removed this morning. Denies nausea/bloating. No appetite. Has had some water and popsicle. She has been up in room. Mark removed. No flatus yet.         Physical Exam:   Blood pressure 108/60, pulse 76, temperature 98.8  F (37.1  C), temperature source Oral, resp. rate 18, height 1.6 m (5' 3\"), weight 52.2 kg (115 lb 1.6 oz), SpO2 99%.    I/O last 3 completed shifts:  In: 2410 [I.V.:2385; NG/GT:25]  Out: 1560 [Urine:1250; Emesis/NG output:300; Blood:10]    Abdomen: soft, ND, +mild diffuse tenderness, +BS  Inc(s) - bandage is clean and dry           Data:     Recent Labs   Lab Test 10/26/24  0702 10/25/24  0515 10/24/24  1538   HGB 13.0 14.3 15.2   WBC 8.4 13.9* 11.4*          Jayme Carl PA-C  Text page [phone] 8am-4pm  After 4pm call [phone]     Seen and agree,    Daniel Castillo MD  [facility]    "

## 2024-10-26 NOTE — PLAN OF CARE
"End of Shift Summary  For vital signs and complete assessments, please see documentation flowsheets.     Pertinent assessments: A&O, VSS on RA. Mark CDI , draining well. No diet at this time, Hospitalist paged, no response. On D5 LR w/ KCL at 75. NG to LIS, with clear greenish output. Not oob, Ind at baseline. No pain at rest, 5/10 with movement.     Major Shift Events: turning for comfort, prn dilaudid given x1.    Treatment Plan: Pain management. Awaiting return of bowel function.     Bedside Nurse: Stuart Heath RN       Goal Outcome Evaluation:      Problem: Adult Inpatient Plan of Care  Goal: Plan of Care Review  Description: The Plan of Care Review/Shift note should be completed every shift.  The Outcome Evaluation is a brief statement about your assessment that the patient is improving, declining, or no change.  This information will be displayed automatically on your shift  note.  Outcome: Progressing  Flowsheets (Taken 10/25/2024 2211)  Outcome Evaluation: pain management, encourage activity, await return of normal bowel function. continue NG tube to LIS  Plan of Care Reviewed With:   patient   child  Overall Patient Progress: improving  Goal: Patient-Specific Goal (Individualized)  Description: You can add care plan individualizations to a care plan. Examples of Individualization might be:  \"Parent requests to be called daily at 9am for status\", \"I have a hard time hearing out of my right ear\", or \"Do not touch me to wake me up as it startles  me\".  Outcome: Progressing  Goal: Absence of Hospital-Acquired Illness or Injury  Outcome: Progressing  Intervention: Identify and Manage Fall Risk  Recent Flowsheet Documentation  Taken 10/25/2024 6001 by Stuart Heath RN  Safety Promotion/Fall Prevention:   activity supervised   clutter free environment maintained   nonskid shoes/slippers when out of bed   room near nurse's station   room organization consistent   safety round/check " completed  Intervention: Prevent Skin Injury  Recent Flowsheet Documentation  Taken 10/25/2024 1930 by Stuart Heath RN  Body Position:   turned   left   side-lying  Taken 10/25/2024 1551 by Stuart Heath RN  Body Position: supine, head elevated  Intervention: Prevent and Manage VTE (Venous Thromboembolism) Risk  Recent Flowsheet Documentation  Taken 10/25/2024 1700 by Stuart Heath RN  VTE Prevention/Management: SCDs off (sequential compression devices)  Taken 10/25/2024 1551 by Stuart Heath RN  VTE Prevention/Management: SCDs on (sequential compression devices)  Goal: Optimal Comfort and Wellbeing  Outcome: Progressing  Goal: Readiness for Transition of Care  Outcome: Progressing     Problem: Intestinal Obstruction  Goal: Optimal Bowel Function  Outcome: Progressing  Intervention: Promote Bowel Function  Recent Flowsheet Documentation  Taken 10/25/2024 1930 by Stuart Heath RN  Body Position:   turned   left   side-lying  Taken 10/25/2024 1551 by Stuart Heath RN  Body Position: supine, head elevated  Head of Bed (HOB) Positioning: HOB at 15 degrees  Positioning/Transfer Devices:   pillows   in use  Goal: Fluid and Electrolyte Balance  Outcome: Progressing  Intervention: Monitor and Manage Hypovolemia  Recent Flowsheet Documentation  Taken 10/25/2024 1551 by Stuart Heath RN  Fluid/Electrolyte Management: oral rehydration therapy initiated  Goal: Absence of Infection Signs and Symptoms  Outcome: Progressing  Goal: Optimize Nutrition Status  Outcome: Progressing  Goal: Optimal Pain Control and Function  Outcome: Progressing     Problem: Pain Acute  Goal: Optimal Pain Control and Function  Outcome: Progressing  Intervention: Prevent or Manage Pain  Recent Flowsheet Documentation  Taken 10/25/2024 1551 by Stuart Heath RN  Medication Review/Management: medications reviewed         Plan of Care Reviewed With: patient, child    Overall Patient Progress: improvingOverall  Patient Progress: improving    Outcome Evaluation: pain management, encourage activity, await return of normal bowel function. continue NG tube to LIS

## 2024-10-26 NOTE — PLAN OF CARE
"End of Shift Summary  For vital signs and complete assessments, please see documentation flowsheets.     Pertinent assessments: VSS on 1 L NC. Capno in place. Mark with adequate output. NG to LIS, with scant output. Tolerated a popsicle. BS hypo, denies flatus. Abdominal incision dressing CDI. Mild abdominal pain improved with repositioning and ice pack.     Treatment Plan: Pain management. AROBF, IVF, capno.       Plan of Care Reviewed With: patient    Overall Patient Progress: improving    Outcome Evaluation: BS hypo, no flatus. Abdominal pain minimal, ice and repositioning for comfort.      Problem: Adult Inpatient Plan of Care  Goal: Plan of Care Review  Description: The Plan of Care Review/Shift note should be completed every shift.  The Outcome Evaluation is a brief statement about your assessment that the patient is improving, declining, or no change.  This information will be displayed automatically on your shift  note.  Outcome: Progressing  Flowsheets (Taken 10/26/2024 0515)  Outcome Evaluation: BS hypo, no flatus. Abdominal pain minimal, ice and repositioning for comfort.  Plan of Care Reviewed With: patient  Overall Patient Progress: improving  Goal: Patient-Specific Goal (Individualized)  Description: You can add care plan individualizations to a care plan. Examples of Individualization might be:  \"Parent requests to be called daily at 9am for status\", \"I have a hard time hearing out of my right ear\", or \"Do not touch me to wake me up as it startles  me\".  Outcome: Progressing  Goal: Absence of Hospital-Acquired Illness or Injury  Outcome: Progressing  Intervention: Identify and Manage Fall Risk  Recent Flowsheet Documentation  Taken 10/26/2024 0200 by Sheri Champagne, RN  Safety Promotion/Fall Prevention: safety round/check completed  Intervention: Prevent Skin Injury  Recent Flowsheet Documentation  Taken 10/26/2024 0200 by Sheri Champagne, RN  Body Position: turned  Goal: Optimal Comfort and " Wellbeing  Outcome: Progressing  Intervention: Monitor Pain and Promote Comfort  Recent Flowsheet Documentation  Taken 10/26/2024 0156 by Sheri Champagne RN  Pain Management Interventions:   cold applied   repositioned  Goal: Readiness for Transition of Care  Outcome: Progressing     Problem: Intestinal Obstruction  Goal: Optimal Bowel Function  Outcome: Progressing  Intervention: Promote Bowel Function  Recent Flowsheet Documentation  Taken 10/26/2024 0200 by Sheri Champagne RN  Body Position: turned  Positioning/Transfer Devices:   pillows   in use  Goal: Fluid and Electrolyte Balance  Outcome: Progressing  Goal: Absence of Infection Signs and Symptoms  Outcome: Progressing  Goal: Optimize Nutrition Status  Outcome: Progressing  Goal: Optimal Pain Control and Function  Outcome: Progressing  Intervention: Prevent or Manage Pain  Recent Flowsheet Documentation  Taken 10/26/2024 0156 by Sheri Champagne RN  Pain Management Interventions:   cold applied   repositioned     Problem: Pain Acute  Goal: Optimal Pain Control and Function  Outcome: Progressing  Intervention: Develop Pain Management Plan  Recent Flowsheet Documentation  Taken 10/26/2024 0156 by Sheri Champagne RN  Pain Management Interventions:   cold applied   repositioned  Intervention: Prevent or Manage Pain  Recent Flowsheet Documentation  Taken 10/26/2024 0200 by Sheri Champagne RN  Medication Review/Management: medications reviewed

## 2024-10-27 LAB
ANION GAP SERPL CALCULATED.3IONS-SCNC: 8 MMOL/L (ref 7–15)
BUN SERPL-MCNC: 10.5 MG/DL (ref 8–23)
CALCIUM SERPL-MCNC: 8.7 MG/DL (ref 8.8–10.4)
CHLORIDE SERPL-SCNC: 108 MMOL/L (ref 98–107)
CREAT SERPL-MCNC: 0.79 MG/DL (ref 0.51–0.95)
EGFRCR SERPLBLD CKD-EPI 2021: 79 ML/MIN/1.73M2
GLUCOSE SERPL-MCNC: 104 MG/DL (ref 70–99)
HCO3 SERPL-SCNC: 25 MMOL/L (ref 22–29)
MAGNESIUM SERPL-MCNC: 2.1 MG/DL (ref 1.7–2.3)
POTASSIUM SERPL-SCNC: 4.5 MMOL/L (ref 3.4–5.3)
SODIUM SERPL-SCNC: 141 MMOL/L (ref 135–145)

## 2024-10-27 PROCEDURE — 120N000001 HC R&B MED SURG/OB

## 2024-10-27 PROCEDURE — 250N000013 HC RX MED GY IP 250 OP 250 PS 637: Performed by: STUDENT IN AN ORGANIZED HEALTH CARE EDUCATION/TRAINING PROGRAM

## 2024-10-27 PROCEDURE — 80048 BASIC METABOLIC PNL TOTAL CA: CPT | Performed by: INTERNAL MEDICINE

## 2024-10-27 PROCEDURE — 250N000013 HC RX MED GY IP 250 OP 250 PS 637: Performed by: PHYSICIAN ASSISTANT

## 2024-10-27 PROCEDURE — 99232 SBSQ HOSP IP/OBS MODERATE 35: CPT | Performed by: INTERNAL MEDICINE

## 2024-10-27 PROCEDURE — 83735 ASSAY OF MAGNESIUM: CPT | Performed by: STUDENT IN AN ORGANIZED HEALTH CARE EDUCATION/TRAINING PROGRAM

## 2024-10-27 PROCEDURE — 250N000011 HC RX IP 250 OP 636: Performed by: INTERNAL MEDICINE

## 2024-10-27 PROCEDURE — 36415 COLL VENOUS BLD VENIPUNCTURE: CPT | Performed by: INTERNAL MEDICINE

## 2024-10-27 RX ADMIN — FAMOTIDINE 20 MG: 10 INJECTION, SOLUTION INTRAVENOUS at 21:18

## 2024-10-27 RX ADMIN — FAMOTIDINE 20 MG: 10 INJECTION, SOLUTION INTRAVENOUS at 09:28

## 2024-10-27 RX ADMIN — BUPROPION HYDROCHLORIDE 150 MG: 150 TABLET, FILM COATED, EXTENDED RELEASE ORAL at 19:02

## 2024-10-27 RX ADMIN — CEFTRIAXONE 1 G: 1 INJECTION, POWDER, FOR SOLUTION INTRAMUSCULAR; INTRAVENOUS at 17:00

## 2024-10-27 RX ADMIN — GABAPENTIN 900 MG: 300 CAPSULE ORAL at 21:18

## 2024-10-27 RX ADMIN — LORAZEPAM 0.5 MG: 0.5 TABLET ORAL at 21:18

## 2024-10-27 RX ADMIN — BUPROPION HYDROCHLORIDE 300 MG: 150 TABLET, FILM COATED, EXTENDED RELEASE ORAL at 09:28

## 2024-10-27 RX ADMIN — SERTRALINE 100 MG: 100 TABLET, FILM COATED ORAL at 09:27

## 2024-10-27 NOTE — PLAN OF CARE
"End of Shift Summary  For vital signs and complete assessments, please see documentation flowsheets.     Pertinent assessments: A&O, Up ind now. Denies pain or nausea. Hypoactive BS, not passing gas, last bm 10/24. Tolerating ice chips and popsicles, pt aware to take it slow with intake. IVF at 75.     Major Shift Events: Capno, Ng and Figueroa removed. Walked hallways ind. Voided this evening    Treatment Plan: Awaiting tolerance of diet and return of normal bowel function     Bedside Nurse: Stuart Heath RN     Goal Outcome Evaluation:      Problem: Adult Inpatient Plan of Care  Goal: Plan of Care Review  Description: The Plan of Care Review/Shift note should be completed every shift.  The Outcome Evaluation is a brief statement about your assessment that the patient is improving, declining, or no change.  This information will be displayed automatically on your shift  note.  Outcome: Progressing  Flowsheets (Taken 10/26/2024 2234)  Outcome Evaluation: awaiting return of normal bowel function. voided post figueroa removal. pain managed with prn medication. personal care needs met.  Plan of Care Reviewed With: patient  Overall Patient Progress: improving  Goal: Patient-Specific Goal (Individualized)  Description: You can add care plan individualizations to a care plan. Examples of Individualization might be:  \"Parent requests to be called daily at 9am for status\", \"I have a hard time hearing out of my right ear\", or \"Do not touch me to wake me up as it startles  me\".  Outcome: Progressing  Goal: Absence of Hospital-Acquired Illness or Injury  Outcome: Progressing  Intervention: Identify and Manage Fall Risk  Recent Flowsheet Documentation  Taken 10/26/2024 1637 by Stuart Heath RN  Safety Promotion/Fall Prevention:   activity supervised   room organization consistent   safety round/check completed   supervised activity   clutter free environment maintained  Intervention: Prevent Skin Injury  Recent Flowsheet " Documentation  Taken 10/26/2024 1637 by Stuart Heath RN  Body Position: supine, head elevated  Intervention: Prevent and Manage VTE (Venous Thromboembolism) Risk  Recent Flowsheet Documentation  Taken 10/26/2024 1637 by Stuart Heath RN  VTE Prevention/Management: SCDs off (sequential compression devices)  Goal: Optimal Comfort and Wellbeing  Outcome: Progressing  Goal: Readiness for Transition of Care  Outcome: Progressing     Problem: Intestinal Obstruction  Goal: Optimal Bowel Function  Outcome: Progressing  Intervention: Promote Bowel Function  Recent Flowsheet Documentation  Taken 10/26/2024 1637 by Stuart Heath RN  Body Position: supine, head elevated  Head of Bed (HOB) Positioning: HOB at 15 degrees  Positioning/Transfer Devices:   pillows   in use  Goal: Fluid and Electrolyte Balance  Outcome: Progressing  Intervention: Monitor and Manage Hypovolemia  Recent Flowsheet Documentation  Taken 10/26/2024 1637 by Stuart Heath RN  Fluid/Electrolyte Management: (IV fluids continued) other (see comments)  Goal: Absence of Infection Signs and Symptoms  Outcome: Progressing  Goal: Optimize Nutrition Status  Outcome: Progressing  Goal: Optimal Pain Control and Function  Outcome: Progressing     Problem: Pain Acute  Goal: Optimal Pain Control and Function  Outcome: Progressing  Intervention: Prevent or Manage Pain  Recent Flowsheet Documentation  Taken 10/26/2024 1637 by Stuart Heath RN  Medication Review/Management: medications reviewed         Plan of Care Reviewed With: patient    Overall Patient Progress: improvingOverall Patient Progress: improving    Outcome Evaluation: awaiting return of normal bowel function. voided post figueroa removal. pain managed with prn medication. personal care needs met.

## 2024-10-27 NOTE — PLAN OF CARE
"Pertinent assessments: A&O, up ind in room and hallways. Endorsed 3/10 abd pain with movement, no prns given. Voiding freely, passing gas but no bm. K, Mag WNR. Abd dressing CDI, faint BS.     Major Shift Events Diet advanced to clears.     Treatment Plan: Waiting for return of normal bowel function.     Bedside Nurse: Faye Farrell RN       Problem: Adult Inpatient Plan of Care  Goal: Plan of Care Review  Description: The Plan of Care Review/Shift note should be completed every shift.  The Outcome Evaluation is a brief statement about your assessment that the patient is improving, declining, or no change.  This information will be displayed automatically on your shift  note.  Outcome: Progressing  Flowsheets (Taken 10/27/2024 1506)  Outcome Evaluation: Treatment Plan: Waiting for return of normal bowel function.  Plan of Care Reviewed With: patient  Overall Patient Progress: improving  Goal: Patient-Specific Goal (Individualized)  Description: You can add care plan individualizations to a care plan. Examples of Individualization might be:  \"Parent requests to be called daily at 9am for status\", \"I have a hard time hearing out of my right ear\", or \"Do not touch me to wake me up as it startles  me\".  Outcome: Progressing  Goal: Absence of Hospital-Acquired Illness or Injury  Outcome: Progressing  Intervention: Identify and Manage Fall Risk  Recent Flowsheet Documentation  Taken 10/27/2024 0943 by Faye Farrell RN  Safety Promotion/Fall Prevention:   clutter free environment maintained   nonskid shoes/slippers when out of bed   room near nurse's station   safety round/check completed  Intervention: Prevent and Manage VTE (Venous Thromboembolism) Risk  Recent Flowsheet Documentation  Taken 10/27/2024 0943 by Faye Farrell RN  VTE Prevention/Management: SCDs off (sequential compression devices)  Intervention: Prevent Infection  Recent Flowsheet Documentation  Taken 10/27/2024 0943 by Faye Farrell, " RN  Infection Prevention:   rest/sleep promoted   single patient room provided  Goal: Optimal Comfort and Wellbeing  Outcome: Progressing  Goal: Readiness for Transition of Care  Outcome: Progressing     Problem: Intestinal Obstruction  Goal: Optimal Bowel Function  Outcome: Progressing  Goal: Fluid and Electrolyte Balance  Outcome: Progressing  Goal: Absence of Infection Signs and Symptoms  Outcome: Progressing  Goal: Optimize Nutrition Status  Outcome: Progressing  Goal: Optimal Pain Control and Function  Outcome: Progressing     Problem: Pain Acute  Goal: Optimal Pain Control and Function  Outcome: Progressing  Intervention: Prevent or Manage Pain  Recent Flowsheet Documentation  Taken 10/27/2024 0932 by Faye Farrell RN  Medication Review/Management: medications reviewed   Goal Outcome Evaluation:      Plan of Care Reviewed With: patient    Overall Patient Progress: improvingOverall Patient Progress: improving    Outcome Evaluation: Treatment Plan: Waiting for return of normal bowel function.

## 2024-10-27 NOTE — PLAN OF CARE
"End of Shift Summary  For vital signs and complete assessments, please see documentation flowsheets.     Pertinent assessments: VSS on RA. Reports mild abdominal discomfort, but declines intervention. Abdominal dressing CDI. BS hypoactive, denies flatus. Tolerating ice chips. Pt voided before she went to bed, without difficulty.    Treatment Plan: IVF, AROBF, encourage ambulation, symptom management      Plan of Care Reviewed With: patient    Overall Patient Progress: improving  Outcome Evaluation: Denies nausea, bowel sounds hypoactive, still not passing flatus.      Problem: Adult Inpatient Plan of Care  Goal: Plan of Care Review  Description: The Plan of Care Review/Shift note should be completed every shift.  The Outcome Evaluation is a brief statement about your assessment that the patient is improving, declining, or no change.  This information will be displayed automatically on your shift  note.  Outcome: Progressing  Flowsheets (Taken 10/27/2024 0638)  Outcome Evaluation: Denies nausea, bowel sounds hypoactive, still not passing flatus.  Plan of Care Reviewed With: patient  Overall Patient Progress: improving  Goal: Patient-Specific Goal (Individualized)  Description: You can add care plan individualizations to a care plan. Examples of Individualization might be:  \"Parent requests to be called daily at 9am for status\", \"I have a hard time hearing out of my right ear\", or \"Do not touch me to wake me up as it startles  me\".  Outcome: Progressing  Goal: Absence of Hospital-Acquired Illness or Injury  Outcome: Progressing  Intervention: Identify and Manage Fall Risk  Recent Flowsheet Documentation  Taken 10/27/2024 0043 by Sheri Champagne RN  Safety Promotion/Fall Prevention: safety round/check completed  Goal: Optimal Comfort and Wellbeing  Outcome: Progressing  Goal: Readiness for Transition of Care  Outcome: Progressing     Problem: Intestinal Obstruction  Goal: Optimal Bowel Function  Outcome: " Progressing  Goal: Fluid and Electrolyte Balance  Outcome: Progressing  Goal: Absence of Infection Signs and Symptoms  Outcome: Progressing  Goal: Optimize Nutrition Status  Outcome: Progressing  Goal: Optimal Pain Control and Function  Outcome: Progressing     Problem: Pain Acute  Goal: Optimal Pain Control and Function  Outcome: Progressing  Intervention: Prevent or Manage Pain  Recent Flowsheet Documentation  Taken 10/27/2024 0043 by Sheri Champagne RN  Medication Review/Management: medications reviewed

## 2024-10-27 NOTE — PROGRESS NOTES
[facility]   General Surgery Progress Note        Postop day #1 after exploratory laparotomy and extensive lysis of adhesions.  The patient states that she passed a small amount of gas.  She has been tolerating popsicles and sips without difficulty.    The patient's incision is clean dry and intact.  The abdomen is soft.  There are a few bowel sounds present.    Overall, the patient is doing well.  I will order a clear liquid diet for her.  Hopefully, she will have some increase in her bowel activity over the next 24 hours, and could then begin to advance a bit more.    Daniel Castillo MD  [facility]

## 2024-10-27 NOTE — PROGRESS NOTES
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 She presented to the ED on 10/24/2024 with abdominal pain and vomiting. ED evaluation suggested possible closed loop bowel obstruction.  She was admitted for cares and seen by surgery. Conservative management was attempted but patient ultimately needed exploratory laparotomy with lysis of adhesions on 10/25/24.     Problem list:     Complete closed loop SBO  Status post exploratory laparoscopy with extensive lysis of adhesions on 10/25/2024  Post op ileus  -Doing well. Diet advanced to clear liquids per surgery  -Diet and pain control per surgery     Possible UTI    -Urinalysis at urgent care was positive for nitrites.  Given ceftriaxone in the ED and this was continued.    -Follow urine culture in care everywhere.   -Intention is to finish a 5 day course of antibiotics; may change to oral once diet being tolerated     High anion arnold metabolic acidosis  -Resolved with IVF     Leukocytosis  -11.4 on presentation, nichole to 13.9 and then normalized to 8.4 after surgery  -Resolved.        Diet: Clear Liquid Diet    DVT Prophylaxis: Pneumatic Compression Devices and Ambulate every shift  Mark Catheter: Not present  Lines: None     Cardiac Monitoring: None  Code Status: Full Code      Clinically Significant Risk Factors          # Hyperchloremia: Highest Cl = 111 mmol/L in last 2 days, will monitor as appropriate                                   Disposition Plan     Medically Ready for Discharge: Anticipated in 2-4 Days             Eric Almaraz MD  Hospitalist Service  [facility]  Securely message with [employer] (more info)  Text page via [employer] Paging/Directory    ______________________________________________________________________    Interval History   No new problems. Progressing well. Not much pain. Excited to start clear liquids.     Physical Exam   Vital Signs: Temp: 98  F (36.7  C) Temp src: Oral BP: 115/69 Pulse: 71   Resp: 16 SpO2: 99 % O2 Device: None (Room air)    Weight: 115 lbs 1.6 oz    GENERAL:  Comfortable. Cooperative.  PSYCH: pleasant, oriented, No acute distress.  EYES: PERRLA, Normal conjunctiva.  HEART:  Regular rate and rhythm. No JVD. Pulses normal. No edema.  LUNGS:  Clear to auscultation, normal Respiratory effort.  ABDOMEN:  Soft, no hepatosplenomegaly, a few bowel sounds.  EXTREMETIES: No clubbing, cyanosis or ischemia  SKIN:  Dry to touch, No rash.      Medical Decision Making       40 MINUTES SPENT BY ME on the date of service doing chart review, history, exam, documentation & further activities per the note.      Data     I have personally reviewed the following data over the past 24 hrs:    N/A  \   N/A   / N/A     141 108 (H) 10.5 /  104 (H)   4.5 25 0.79 \       Imaging results reviewed over the past 24 hrs:   No results found for this or any previous visit (from the past 24 hours).  Recent Labs   Lab 10/27/24  0748 10/26/24  0702 10/25/24  0939 10/25/24  0515 10/24/24  1538   WBC  --  8.4  --  13.9* 11.4*   HGB  --  13.0  --  14.3 15.2   MCV  --  99  --  94 93   PLT  --  214  --  271 298    145  --  141 138   POTASSIUM 4.5 5.2  --  3.9 4.3   CHLORIDE 108* 111*  --  104 99   CO2 25 25  --  25 21*   BUN 10.5 15.8  --  18.2 14.3   CR 0.79 0.82  --  0.88 0.82   ANIONGAP 8 9  --  12 18*   JOSE ANTONIO 8.7* 8.6*  --  9.5 9.8   * 120* 138* 155* 120*   ALBUMIN  --   --   --   --  4.7   PROTTOTAL  --   --   --   --  7.6   BILITOTAL  --   --   --   --  0.4   ALKPHOS  --   --   --   --  80   ALT  --   --   --   --  27   AST  --   --   --   --  43   LIPASE  --   --   --   --  26

## 2024-10-28 LAB
MAGNESIUM SERPL-MCNC: 2.1 MG/DL (ref 1.7–2.3)
POTASSIUM SERPL-SCNC: 4.3 MMOL/L (ref 3.4–5.3)

## 2024-10-28 PROCEDURE — 120N000001 HC R&B MED SURG/OB

## 2024-10-28 PROCEDURE — 250N000011 HC RX IP 250 OP 636: Performed by: INTERNAL MEDICINE

## 2024-10-28 PROCEDURE — 36415 COLL VENOUS BLD VENIPUNCTURE: CPT | Performed by: INTERNAL MEDICINE

## 2024-10-28 PROCEDURE — 250N000013 HC RX MED GY IP 250 OP 250 PS 637: Performed by: PHYSICIAN ASSISTANT

## 2024-10-28 PROCEDURE — 84132 ASSAY OF SERUM POTASSIUM: CPT | Performed by: INTERNAL MEDICINE

## 2024-10-28 PROCEDURE — 250N000013 HC RX MED GY IP 250 OP 250 PS 637: Performed by: STUDENT IN AN ORGANIZED HEALTH CARE EDUCATION/TRAINING PROGRAM

## 2024-10-28 PROCEDURE — 99207 PR NO BILLABLE SERVICE THIS VISIT: CPT | Performed by: INTERNAL MEDICINE

## 2024-10-28 PROCEDURE — 83735 ASSAY OF MAGNESIUM: CPT | Performed by: INTERNAL MEDICINE

## 2024-10-28 RX ORDER — BISACODYL 10 MG
10 SUPPOSITORY, RECTAL RECTAL ONCE
Status: COMPLETED | OUTPATIENT
Start: 2024-10-28 | End: 2024-10-28

## 2024-10-28 RX ORDER — AMOXICILLIN 250 MG
1 CAPSULE ORAL 2 TIMES DAILY
Status: DISCONTINUED | OUTPATIENT
Start: 2024-10-28 | End: 2024-11-02 | Stop reason: HOSPADM

## 2024-10-28 RX ORDER — AMOXICILLIN 250 MG
2 CAPSULE ORAL 2 TIMES DAILY
Status: DISCONTINUED | OUTPATIENT
Start: 2024-10-28 | End: 2024-11-02 | Stop reason: HOSPADM

## 2024-10-28 RX ORDER — POLYETHYLENE GLYCOL 3350 17 G/17G
17 POWDER, FOR SOLUTION ORAL DAILY
Status: DISCONTINUED | OUTPATIENT
Start: 2024-10-28 | End: 2024-10-29

## 2024-10-28 RX ADMIN — CEFTRIAXONE 1 G: 1 INJECTION, POWDER, FOR SOLUTION INTRAMUSCULAR; INTRAVENOUS at 18:23

## 2024-10-28 RX ADMIN — POLYETHYLENE GLYCOL 3350 17 G: 17 POWDER, FOR SOLUTION ORAL at 12:09

## 2024-10-28 RX ADMIN — GABAPENTIN 900 MG: 300 CAPSULE ORAL at 20:20

## 2024-10-28 RX ADMIN — SERTRALINE 100 MG: 100 TABLET, FILM COATED ORAL at 10:09

## 2024-10-28 RX ADMIN — BISACODYL 10 MG: 10 SUPPOSITORY RECTAL at 12:10

## 2024-10-28 RX ADMIN — SENNOSIDES AND DOCUSATE SODIUM 1 TABLET: 8.6; 5 TABLET ORAL at 12:09

## 2024-10-28 RX ADMIN — LORAZEPAM 0.5 MG: 0.5 TABLET ORAL at 20:24

## 2024-10-28 RX ADMIN — FAMOTIDINE 20 MG: 10 INJECTION, SOLUTION INTRAVENOUS at 10:09

## 2024-10-28 RX ADMIN — FAMOTIDINE 20 MG: 10 INJECTION, SOLUTION INTRAVENOUS at 20:20

## 2024-10-28 RX ADMIN — BUPROPION HYDROCHLORIDE 150 MG: 150 TABLET, FILM COATED, EXTENDED RELEASE ORAL at 20:21

## 2024-10-28 RX ADMIN — BUPROPION HYDROCHLORIDE 300 MG: 150 TABLET, FILM COATED, EXTENDED RELEASE ORAL at 10:09

## 2024-10-28 NOTE — PROGRESS NOTES
"[facility]   General Surgery Progress Note          Assessment and Plan:   Assessment:   POD#3 s/p exploratory laparotomy and extensive lysis of adhesions for SBO  Postoperative ileus as expected - resolving (+flatus)  Firm stool noted throughout colon at time of surgery      Plan:   -Diet: Ok for full liquids  -Bowel regimen: Miralax, senna, dulcolax prn  -IV ABX: Rocephin finished  -IV fluids:discontinued   -Activity as tolerated, walk halls 2-3 times per day  -Hospitalist managing medically  -Await further return of bowel function     Seen and agree.     Kyle Fofana MD        Interval History:   Sitting up in chair, doing well. She continues to feel better every day. She is in good spirits. She tells me she has been up walking as able and has passed flatus several times. No BM yet. She is tolerating clears and wants to advance diet. She denies pain at rest. Voiding independently.         Physical Exam:   Blood pressure 117/74, pulse 67, temperature 98.7  F (37.1  C), temperature source Oral, resp. rate 16, height 1.6 m (5' 3\"), weight 52.2 kg (115 lb 1.6 oz), SpO2 95%.    I/O last 3 completed shifts:  In: 120 [P.O.:120]  Out: -     Abdomen: soft, ND, NT, +BS  Inc(s) - clean, dry, intact with staples, no erythema, no drainage          Data:     Recent Labs   Lab 10/28/24  0714 10/27/24  0748 10/26/24  0702 10/25/24  0939 10/25/24  0515   NA  --  141 145  --  141   POTASSIUM 4.3 4.5 5.2  --  3.9   CHLORIDE  --  108* 111*  --  104   CO2  --  25 25  --  25   ANIONGAP  --  8 9  --  12   GLC  --  104* 120* 138* 155*   BUN  --  10.5 15.8  --  18.2   CR  --  0.79 0.82  --  0.88   GFRESTIMATED  --  79 76  --  69   JOSE ANTONIO  --  8.7* 8.6*  --  9.5     Recent Labs   Lab 10/26/24  0702 10/25/24  0515 10/24/24  1538   WBC 8.4 13.9* 11.4*   HGB 13.0 14.3 15.2   HCT 41.1 42.5 43.6   MCV 99 94 93    271 298          Jayme Carl PA-C  Text page [phone] 8am-4pm  After 4pm call [phone]     "

## 2024-10-28 NOTE — PLAN OF CARE
"End of Shift Summary  For vital signs and complete assessments, please see documentation flowsheets.     Pertinent assessments: A&O, up ind in room and hallways. Denies pain this shift. Voiding freely, passing gas but no bm. Meds for constipation given, awaiting results. K & Mag WDL. Abd dressing CDI.     Major Shift Events: Advanced to full liquid diet. Tolerating well.    Treatment Plan: await return of bowel function, finish course of iv abx     Bedside Nurse: Maribel Willis RN    Goal Outcome Evaluation:      Plan of Care Reviewed With: patient    Overall Patient Progress: improvingOverall Patient Progress: improving    Outcome Evaluation: tolerating full liquid diet. Denies pain. VSS.      Problem: Adult Inpatient Plan of Care  Goal: Plan of Care Review  Description: The Plan of Care Review/Shift note should be completed every shift.  The Outcome Evaluation is a brief statement about your assessment that the patient is improving, declining, or no change.  This information will be displayed automatically on your shift  note.  Outcome: Progressing  Flowsheets (Taken 10/28/2024 1734)  Outcome Evaluation: tolerating full liquid diet. Denies pain. VSS.  Plan of Care Reviewed With: patient  Overall Patient Progress: improving  Goal: Patient-Specific Goal (Individualized)  Description: You can add care plan individualizations to a care plan. Examples of Individualization might be:  \"Parent requests to be called daily at 9am for status\", \"I have a hard time hearing out of my right ear\", or \"Do not touch me to wake me up as it startles  me\".  Outcome: Progressing  Goal: Absence of Hospital-Acquired Illness or Injury  Outcome: Progressing  Intervention: Prevent Skin Injury  Recent Flowsheet Documentation  Taken 10/28/2024 1000 by Maribel Willis RN  Body Position: position changed independently  Intervention: Prevent Infection  Recent Flowsheet Documentation  Taken 10/28/2024 1733 by Maribel Willis RN  Infection " Prevention:   hand hygiene promoted   equipment surfaces disinfected  Taken 10/28/2024 1000 by Maribel Willis RN  Infection Prevention:   equipment surfaces disinfected   hand hygiene promoted  Goal: Optimal Comfort and Wellbeing  Outcome: Progressing  Goal: Readiness for Transition of Care  Outcome: Progressing     Problem: Intestinal Obstruction  Goal: Optimal Bowel Function  Outcome: Progressing  Intervention: Promote Bowel Function  Recent Flowsheet Documentation  Taken 10/28/2024 1000 by Maribel Willis RN  Body Position: position changed independently  Positioning/Transfer Devices:   pillows   in use  Goal: Fluid and Electrolyte Balance  Outcome: Progressing  Intervention: Monitor and Manage Hypovolemia  Recent Flowsheet Documentation  Taken 10/28/2024 1000 by Maribel Willis RN  Fluid/Electrolyte Management: fluids provided  Goal: Absence of Infection Signs and Symptoms  Outcome: Progressing  Goal: Optimize Nutrition Status  Outcome: Progressing  Goal: Optimal Pain Control and Function  Outcome: Progressing     Problem: Pain Acute  Goal: Optimal Pain Control and Function  Outcome: Progressing

## 2024-10-28 NOTE — PROGRESS NOTES
[facility]    Medicine Progress Note - Hospitalist Service    Date of Admission:  10/24/2024    Assessment & Plan   Maria Esther Miller is a 74 year old female with history of recurrent SBO (last 2010), melanoma, MDD, JENANA, breast cancer, and prior bowel perf s/p colectomy. She presented to the ED on 10/24/2024 with abdominal pain and vomiting. ED evaluation suggested possible closed loop bowel obstruction.  She was admitted for cares and seen by surgery. Conservative management was attempted but patient ultimately needed exploratory laparotomy with lysis of adhesions on 10/25/24.      Problem list:     Complete closed loop SBO  Status post exploratory laparoscopy with extensive lysis of adhesions on 10/25/2024  Post op ileus  -Doing well, per surgery. Diet advanced to full liquids per surgery  -Diet and pain control per surgery     Possible UTI    -Urinalysis at urgent care was positive for nitrites.  Given ceftriaxone in the ED and this was continued.    -Urine culture is growing pan-sensitive E. Coli. Change Rocephin to Ceftin and complete 5 days of antibiotics in total.      High anion arnold metabolic acidosis  -Resolved with IVF     Leukocytosis  -11.4 on presentation, nichole to 13.9 and then normalized to 8.4 after surgery  -Resolved.           Diet: Full Liquid Diet    DVT Prophylaxis: Pneumatic Compression Devices  Mark Catheter: Not present  Lines: None     Cardiac Monitoring: None  Code Status: Full Code      Clinically Significant Risk Factors          # Hyperchloremia: Highest Cl = 108 mmol/L in last 2 days, will monitor as appropriate                                   Disposition Plan     Medically Ready for Discharge: Anticipated Tomorrow             Eric Almaraz MD  Hospitalist Service  [facility]  Securely message with [employer] (more info)  Text page via [employer] Paging/Directory   ______________________________________________________________________    Interval  History   Tried to see patient twice but she was not available.     Physical Exam   Vital Signs: Temp: 98.7  F (37.1  C) Temp src: Oral BP: (!) 143/78 Pulse: 70   Resp: 20 SpO2: 99 % O2 Device: None (Room air)    Weight: 115 lbs 1.6 oz    Exam deferred; patient was not seen due to not available    Medical Decision Making       20 MINUTES SPENT BY ME on the date of service doing chart review, history, exam, documentation & further activities per the note.      Data     I have personally reviewed the following data over the past 24 hrs:    N/A  \   N/A   / N/A     N/A N/A N/A /  N/A   4.3 N/A N/A \       Imaging results reviewed over the past 24 hrs:   No results found for this or any previous visit (from the past 24 hours).  Recent Labs   Lab 10/28/24  0714 10/27/24  0748 10/26/24  0702 10/25/24  0939 10/25/24  0515 10/24/24  1538   WBC  --   --  8.4  --  13.9* 11.4*   HGB  --   --  13.0  --  14.3 15.2   MCV  --   --  99  --  94 93   PLT  --   --  214  --  271 298   NA  --  141 145  --  141 138   POTASSIUM 4.3 4.5 5.2  --  3.9 4.3   CHLORIDE  --  108* 111*  --  104 99   CO2  --  25 25  --  25 21*   BUN  --  10.5 15.8  --  18.2 14.3   CR  --  0.79 0.82  --  0.88 0.82   ANIONGAP  --  8 9  --  12 18*   JOSE ANTONIO  --  8.7* 8.6*  --  9.5 9.8   GLC  --  104* 120* 138* 155* 120*   ALBUMIN  --   --   --   --   --  4.7   PROTTOTAL  --   --   --   --   --  7.6   BILITOTAL  --   --   --   --   --  0.4   ALKPHOS  --   --   --   --   --  80   ALT  --   --   --   --   --  27   AST  --   --   --   --   --  43   LIPASE  --   --   --   --   --  26

## 2024-10-28 NOTE — PLAN OF CARE
"End of Shift Summary  For vital signs and complete assessments, please see documentation flowsheets.     Pertinent assessments: A&O, up ind in room and hallways. Denies pain this shift. Voiding freely, passing gas but no bm. K, Mag WNR. Abd dressing CDI, faint BS. Tolerating slear liquid diet.      Major Shift Events Diet advanced to clears. IVF discontinued with resolution of acidosis and resuming of liquid consumption.     Treatment Plan: await return of bowel function, finish course of iv abx     Bedside Nurse: Stuart Heath RN       Goal Outcome Evaluation:      Problem: Adult Inpatient Plan of Care  Goal: Plan of Care Review  Description: The Plan of Care Review/Shift note should be completed every shift.  The Outcome Evaluation is a brief statement about your assessment that the patient is improving, declining, or no change.  This information will be displayed automatically on your shift  note.  10/28/2024 0614 by Stuart Heath RN  Outcome: Progressing  Flowsheets (Taken 10/28/2024 0614)  Outcome Evaluation: tolerating slear liquid diet, no signs or attestations of pain. personal care needs met, ambulating halls  Plan of Care Reviewed With: patient  Overall Patient Progress: improving  10/28/2024 0013 by Stuart Heath RN  Outcome: Progressing  Goal: Patient-Specific Goal (Individualized)  Description: You can add care plan individualizations to a care plan. Examples of Individualization might be:  \"Parent requests to be called daily at 9am for status\", \"I have a hard time hearing out of my right ear\", or \"Do not touch me to wake me up as it startles  me\".  10/28/2024 0614 by Stuart Heath RN  Outcome: Progressing  10/28/2024 0013 by Stuart Heath RN  Outcome: Progressing  Goal: Absence of Hospital-Acquired Illness or Injury  10/28/2024 0614 by Stuart Heath RN  Outcome: Progressing  10/28/2024 0013 by Stuart Heath RN  Outcome: Progressing  Intervention: Prevent and " Manage VTE (Venous Thromboembolism) Risk  Recent Flowsheet Documentation  Taken 10/27/2024 2309 by Stuart Heath RN  VTE Prevention/Management: SCDs off (sequential compression devices)  Intervention: Prevent Infection  Recent Flowsheet Documentation  Taken 10/27/2024 2309 by Stuart Heath RN  Infection Prevention:   rest/sleep promoted   single patient room provided  Goal: Optimal Comfort and Wellbeing  10/28/2024 0614 by Stuart Heath RN  Outcome: Progressing  10/28/2024 0013 by Stuart Heath RN  Outcome: Progressing  Goal: Readiness for Transition of Care  10/28/2024 0614 by Stuart Heath RN  Outcome: Progressing  10/28/2024 0013 by Stuart Heath RN  Outcome: Progressing     Problem: Intestinal Obstruction  Goal: Optimal Bowel Function  10/28/2024 0614 by Stuart Heath RN  Outcome: Progressing  10/28/2024 0013 by Stuart Heath RN  Outcome: Progressing  Goal: Fluid and Electrolyte Balance  10/28/2024 0614 by Stuart Heath RN  Outcome: Progressing  10/28/2024 0013 by Stuart Heath RN  Outcome: Progressing  Goal: Absence of Infection Signs and Symptoms  10/28/2024 0614 by Stuart Heath RN  Outcome: Progressing  10/28/2024 0013 by Stuart Heath RN  Outcome: Progressing  Goal: Optimize Nutrition Status  10/28/2024 0614 by Stuart Heath RN  Outcome: Progressing  10/28/2024 0013 by Stuart Heath RN  Outcome: Progressing  Goal: Optimal Pain Control and Function  10/28/2024 0614 by Stuart Heath RN  Outcome: Progressing  10/28/2024 0013 by Stuart Heath RN  Outcome: Progressing     Problem: Pain Acute  Goal: Optimal Pain Control and Function  10/28/2024 0614 by Stuart Heath RN  Outcome: Progressing  10/28/2024 0013 by Stuart Heath RN  Outcome: Progressing  Intervention: Prevent or Manage Pain  Recent Flowsheet Documentation  Taken 10/27/2024 2309 by Stuart Heath RN  Medication Review/Management: medications  reviewed         Plan of Care Reviewed With: patient    Overall Patient Progress: improvingOverall Patient Progress: improving    Outcome Evaluation: tolerating slear liquid diet, no signs or attestations of pain. personal care needs met, ambulating halls

## 2024-10-29 LAB
BACTERIA BLD CULT: NO GROWTH
HOLD SPECIMEN: NORMAL
MAGNESIUM SERPL-MCNC: 2.2 MG/DL (ref 1.7–2.3)
POTASSIUM SERPL-SCNC: 4 MMOL/L (ref 3.4–5.3)

## 2024-10-29 PROCEDURE — 36415 COLL VENOUS BLD VENIPUNCTURE: CPT | Performed by: STUDENT IN AN ORGANIZED HEALTH CARE EDUCATION/TRAINING PROGRAM

## 2024-10-29 PROCEDURE — 99232 SBSQ HOSP IP/OBS MODERATE 35: CPT | Performed by: INTERNAL MEDICINE

## 2024-10-29 PROCEDURE — 250N000013 HC RX MED GY IP 250 OP 250 PS 637: Performed by: PHYSICIAN ASSISTANT

## 2024-10-29 PROCEDURE — 83735 ASSAY OF MAGNESIUM: CPT | Performed by: STUDENT IN AN ORGANIZED HEALTH CARE EDUCATION/TRAINING PROGRAM

## 2024-10-29 PROCEDURE — 84132 ASSAY OF SERUM POTASSIUM: CPT | Performed by: STUDENT IN AN ORGANIZED HEALTH CARE EDUCATION/TRAINING PROGRAM

## 2024-10-29 PROCEDURE — 250N000013 HC RX MED GY IP 250 OP 250 PS 637: Performed by: STUDENT IN AN ORGANIZED HEALTH CARE EDUCATION/TRAINING PROGRAM

## 2024-10-29 PROCEDURE — 120N000001 HC R&B MED SURG/OB

## 2024-10-29 PROCEDURE — 250N000011 HC RX IP 250 OP 636: Performed by: INTERNAL MEDICINE

## 2024-10-29 RX ORDER — POLYETHYLENE GLYCOL 3350 17 G/17G
17 POWDER, FOR SOLUTION ORAL 2 TIMES DAILY
Status: DISCONTINUED | OUTPATIENT
Start: 2024-10-29 | End: 2024-11-02 | Stop reason: HOSPADM

## 2024-10-29 RX ORDER — SODIUM PHOSPHATE,MONO-DIBASIC 19G-7G/118
1 ENEMA (ML) RECTAL
Status: COMPLETED | OUTPATIENT
Start: 2024-10-29 | End: 2024-10-29

## 2024-10-29 RX ADMIN — SENNOSIDES AND DOCUSATE SODIUM 2 TABLET: 8.6; 5 TABLET ORAL at 08:07

## 2024-10-29 RX ADMIN — SENNOSIDES AND DOCUSATE SODIUM 2 TABLET: 8.6; 5 TABLET ORAL at 21:18

## 2024-10-29 RX ADMIN — SODIUM PHOSPHATE, DIBASIC AND SODIUM PHOSPHATE, MONOBASIC 1 ENEMA: 7; 19 ENEMA RECTAL at 18:15

## 2024-10-29 RX ADMIN — LORAZEPAM 0.5 MG: 0.5 TABLET ORAL at 21:19

## 2024-10-29 RX ADMIN — BUPROPION HYDROCHLORIDE 300 MG: 150 TABLET, FILM COATED, EXTENDED RELEASE ORAL at 08:09

## 2024-10-29 RX ADMIN — POLYETHYLENE GLYCOL 3350 17 G: 17 POWDER, FOR SOLUTION ORAL at 21:18

## 2024-10-29 RX ADMIN — BUPROPION HYDROCHLORIDE 150 MG: 150 TABLET, FILM COATED, EXTENDED RELEASE ORAL at 21:21

## 2024-10-29 RX ADMIN — POLYETHYLENE GLYCOL 3350 17 G: 17 POWDER, FOR SOLUTION ORAL at 08:07

## 2024-10-29 RX ADMIN — FAMOTIDINE 20 MG: 10 INJECTION, SOLUTION INTRAVENOUS at 09:59

## 2024-10-29 RX ADMIN — SERTRALINE 100 MG: 100 TABLET, FILM COATED ORAL at 08:07

## 2024-10-29 RX ADMIN — GABAPENTIN 900 MG: 300 CAPSULE ORAL at 21:18

## 2024-10-29 RX ADMIN — FAMOTIDINE 20 MG: 10 INJECTION, SOLUTION INTRAVENOUS at 21:19

## 2024-10-29 NOTE — PLAN OF CARE
"End of Shift Summary  For vital signs and complete assessments, please see documentation flowsheets.     Pertinent assessments: A&Ox4. Independent. Denies pain. No reported BM overnight, although is passing gas. Full liq diet. Abdominal dressing CDI.     Major Shift Events: uneventful, pt slept through the night    Treatment Plan: await return of bowel function. Tolerate diet advancement      Goal Outcome Evaluation:      Plan of Care Reviewed With: patient    Overall Patient Progress: improvingOverall Patient Progress: improving    Outcome Evaluation: Denies pain. Tolerating diet.      Problem: Adult Inpatient Plan of Care  Goal: Plan of Care Review  Description: The Plan of Care Review/Shift note should be completed every shift.  The Outcome Evaluation is a brief statement about your assessment that the patient is improving, declining, or no change.  This information will be displayed automatically on your shift  note.  Outcome: Progressing  Flowsheets (Taken 10/29/2024 0557)  Outcome Evaluation: Denies pain. Tolerating diet.  Plan of Care Reviewed With: patient  Overall Patient Progress: improving  Goal: Patient-Specific Goal (Individualized)  Description: You can add care plan individualizations to a care plan. Examples of Individualization might be:  \"Parent requests to be called daily at 9am for status\", \"I have a hard time hearing out of my right ear\", or \"Do not touch me to wake me up as it startles  me\".  Outcome: Progressing  Goal: Absence of Hospital-Acquired Illness or Injury  Outcome: Progressing  Intervention: Identify and Manage Fall Risk  Recent Flowsheet Documentation  Taken 10/28/2024 2021 by Shelli Martinez, RN  Safety Promotion/Fall Prevention: safety round/check completed  Intervention: Prevent Skin Injury  Recent Flowsheet Documentation  Taken 10/28/2024 2021 by Shelli Martinez RN  Body Position: position changed independently  Intervention: Prevent and Manage VTE (Venous Thromboembolism) " Risk  Recent Flowsheet Documentation  Taken 10/28/2024 2021 by Shelli Martinez RN  VTE Prevention/Management: SCDs off (sequential compression devices)  Intervention: Prevent Infection  Recent Flowsheet Documentation  Taken 10/28/2024 2021 by Shelli Martinez RN  Infection Prevention:   hand hygiene promoted   equipment surfaces disinfected  Goal: Optimal Comfort and Wellbeing  Outcome: Progressing  Goal: Readiness for Transition of Care  Outcome: Progressing     Problem: Intestinal Obstruction  Goal: Optimal Bowel Function  Outcome: Progressing  Intervention: Promote Bowel Function  Recent Flowsheet Documentation  Taken 10/28/2024 2021 by Shelli Martinez RN  Body Position: position changed independently  Head of Bed (HOB) Positioning: HOB at 20-30 degrees  Positioning/Transfer Devices:   pillows   in use  Goal: Fluid and Electrolyte Balance  Outcome: Progressing  Goal: Absence of Infection Signs and Symptoms  Outcome: Progressing  Goal: Optimize Nutrition Status  Outcome: Progressing  Goal: Optimal Pain Control and Function  Outcome: Progressing     Problem: Pain Acute  Goal: Optimal Pain Control and Function  Outcome: Progressing

## 2024-10-29 NOTE — PROGRESS NOTES
"[facility]   General Surgery Progress Note          Assessment and Plan:   Assessment:   POD#4 s/p exploratory laparotomy and extensive lysis of adhesions for SBO  Postoperative ileus as expected - resolving (+flatus)  Firm stool noted throughout colon at time of surgery      Plan:   -Diet: Continue full liquids  -Bowel regimen: Miralax, senna, dulcolax/fleets prn  -IV ABX: Rocephin finished  -IV fluids:discontinued   -Activity as tolerated, walk halls 2-3 times per day  -Hospitalist managing medically  -Await further return of bowel function  Seen walking the [place], had good BM this afternoon, will advance to low residue today.         Interval History:   Sitting up in bed, doing well. Feeling better every day. She is tolerating full liquids and passing flatus. She did have some abdominal discomfort  yesterday and felt bloated, then she passed quite a bit of flatus and felt much better. She had a small \"pasty\" BM.          Physical Exam:   Blood pressure 119/66, pulse 65, temperature 98  F (36.7  C), temperature source Oral, resp. rate 16, height 1.6 m (5' 3\"), weight 52.2 kg (115 lb 1.6 oz), SpO2 97%.    No intake/output data recorded.    Abdomen: soft, ND, NT, +BS  Inc(s) - clean, dry, intact with staples, no erythema, no drainage          Data:     Recent Labs   Lab 10/29/24  0654 10/28/24  0714 10/27/24  0748 10/26/24  0702 10/25/24  0939 10/25/24  0515   NA  --   --  141 145  --  141   POTASSIUM 4.0 4.3 4.5 5.2  --  3.9   CHLORIDE  --   --  108* 111*  --  104   CO2  --   --  25 25  --  25   ANIONGAP  --   --  8 9  --  12   GLC  --   --  104* 120* 138* 155*   BUN  --   --  10.5 15.8  --  18.2   CR  --   --  0.79 0.82  --  0.88   GFRESTIMATED  --   --  79 76  --  69   JOSE ANTONIO  --   --  8.7* 8.6*  --  9.5     Recent Labs   Lab 10/26/24  0702 10/25/24  0515 10/24/24  1538   WBC 8.4 13.9* 11.4*   HGB 13.0 14.3 15.2   HCT 41.1 42.5 43.6   MCV 99 94 93    271 298          Jayme Carl PA-C  Text page " [phone] 8am-4pm  After 4pm call [phone]

## 2024-10-29 NOTE — PROGRESS NOTES
[facility]    Medicine Progress Note - Hospitalist Service    Date of Admission:  10/24/2024    Assessment & Plan   Maria Esther Miller is a 74 year old female with history of recurrent SBO (last 2010), melanoma, MDD, JEANNA, breast cancer, and prior bowel perf s/p colectomy. She presented to the ED on 10/24/2024 with abdominal pain and vomiting. ED evaluation suggested possible closed loop bowel obstruction.  She was admitted for cares and seen by surgery. Conservative management was attempted but patient ultimately needed exploratory laparotomy with lysis of adhesions on 10/25/24.      Problem list:     Complete closed loop SBO  Status post exploratory laparoscopy with extensive lysis of adhesions on 10/25/2024  Post op ileus  -Doing well, per surgery. Diet advanced to low fiber per surgery  -Diet and pain control per surgery  -Still some degree of ileus. Surgery anticipating discharge tomorrow.      Possible UTI    -Urinalysis at urgent care was positive for nitrites.  Given ceftriaxone in the ED and this was continued.    -Urine culture is growing pan-sensitive E. Coli. Change Rocephin to Ceftin and complete 5 days of antibiotics in total.      High anion arnold metabolic acidosis  -Resolved with IVF     Leukocytosis  -11.4 on presentation, nichole to 13.9 and then normalized to 8.4 after surgery  -Resolved.          Diet: Low Fiber Diet    DVT Prophylaxis: Pneumatic Compression Devices  Mark Catheter: Not present  Lines: None     Cardiac Monitoring: None  Code Status: Full Code      Clinically Significant Risk Factors                                        Disposition Plan     Medically Ready for Discharge: Anticipated Tomorrow             Eric Almaraz MD  Hospitalist Service  [facility]  Securely message with [employer] (more info)  Text page via [employer] Paging/Directory   ______________________________________________________________________    Interval History   Doing ok. No  stool but passing gas.     Physical Exam   Vital Signs: Temp: 99  F (37.2  C) Temp src: Oral BP: 120/63 Pulse: 74   Resp: 20 SpO2: 97 % O2 Device: None (Room air)    Weight: 115 lbs 1.6 oz    GENERAL:  Comfortable. Cooperative.  PSYCH: pleasant, oriented, No acute distress.  EYES: PERRLA, Normal conjunctiva.  HEART:  Regular rate and rhythm. No JVD. Pulses normal. No edema.  LUNGS:  Clear to auscultation, normal Respiratory effort.  ABDOMEN:  Soft, no hepatosplenomegaly, slightly hypoactive bowel sounds.  EXTREMETIES: No clubbing, cyanosis or ischemia  SKIN:  Dry to touch, No rash.      Medical Decision Making       40 MINUTES SPENT BY ME on the date of service doing chart review, history, exam, documentation & further activities per the note.      Data     I have personally reviewed the following data over the past 24 hrs:    N/A  \   N/A   / N/A     N/A N/A N/A /  N/A   4.0 N/A N/A \       Imaging results reviewed over the past 24 hrs:   No results found for this or any previous visit (from the past 24 hours).  Recent Labs   Lab 10/29/24  0654 10/28/24  0714 10/27/24  0748 10/26/24  0702 10/25/24  0939 10/25/24  0515 10/24/24  1538   WBC  --   --   --  8.4  --  13.9* 11.4*   HGB  --   --   --  13.0  --  14.3 15.2   MCV  --   --   --  99  --  94 93   PLT  --   --   --  214  --  271 298   NA  --   --  141 145  --  141 138   POTASSIUM 4.0 4.3 4.5 5.2  --  3.9 4.3   CHLORIDE  --   --  108* 111*  --  104 99   CO2  --   --  25 25  --  25 21*   BUN  --   --  10.5 15.8  --  18.2 14.3   CR  --   --  0.79 0.82  --  0.88 0.82   ANIONGAP  --   --  8 9  --  12 18*   JOSE ANTONIO  --   --  8.7* 8.6*  --  9.5 9.8   GLC  --   --  104* 120* 138* 155* 120*   ALBUMIN  --   --   --   --   --   --  4.7   PROTTOTAL  --   --   --   --   --   --  7.6   BILITOTAL  --   --   --   --   --   --  0.4   ALKPHOS  --   --   --   --   --   --  80   ALT  --   --   --   --   --   --  27   AST  --   --   --   --   --   --  43   LIPASE  --   --   --   --   --    --  01

## 2024-10-29 NOTE — PLAN OF CARE
"Goal Outcome Evaluation:    Pertinent assessments: Pt A&Ox4. VSS, on RA. Up Independent in the room.. Denies pain and nausea. Distended abd. Abdominal dressing CDI. No BM yet, FLEET enema given today. Advanced to low fiber diet, tolerating well. PIV SL.     Major Shift Events: uneventful  Treatment Plan: await return of bowel function. Tolerate diet advancement      Problem: Adult Inpatient Plan of Care  Goal: Plan of Care Review  Description: The Plan of Care Review/Shift note should be completed every shift.  The Outcome Evaluation is a brief statement about your assessment that the patient is improving, declining, or no change.  This information will be displayed automatically on your shift  note.  Outcome: Progressing  Flowsheets (Taken 10/29/2024 1829)  Outcome Evaluation: Denies pain and nausea. Advanced diet.  Plan of Care Reviewed With: patient  Overall Patient Progress: improving  Goal: Patient-Specific Goal (Individualized)  Description: You can add care plan individualizations to a care plan. Examples of Individualization might be:  \"Parent requests to be called daily at 9am for status\", \"I have a hard time hearing out of my right ear\", or \"Do not touch me to wake me up as it startles  me\".  Outcome: Progressing  Goal: Absence of Hospital-Acquired Illness or Injury  Outcome: Progressing  Intervention: Identify and Manage Fall Risk  Recent Flowsheet Documentation  Taken 10/29/2024 0800 by Kim Veliz RN  Safety Promotion/Fall Prevention:   nonskid shoes/slippers when out of bed   room near nurse's station   safety round/check completed  Intervention: Prevent Skin Injury  Recent Flowsheet Documentation  Taken 10/29/2024 0800 by Kim Veliz RN  Body Position: position changed independently  Intervention: Prevent and Manage VTE (Venous Thromboembolism) Risk  Recent Flowsheet Documentation  Taken 10/29/2024 0800 by Kim Veliz RN  VTE Prevention/Management: SCDs off (sequential compression " devices)  Intervention: Prevent Infection  Recent Flowsheet Documentation  Taken 10/29/2024 0800 by Kim Veliz RN  Infection Prevention: single patient room provided  Goal: Optimal Comfort and Wellbeing  Outcome: Progressing  Goal: Readiness for Transition of Care  Outcome: Progressing     Problem: Intestinal Obstruction  Goal: Optimal Bowel Function  Outcome: Progressing  Intervention: Promote Bowel Function  Recent Flowsheet Documentation  Taken 10/29/2024 0800 by Kim Veliz RN  Body Position: position changed independently  Head of Bed (HOB) Positioning: HOB at 20-30 degrees  Positioning/Transfer Devices:   pillows   in use  Goal: Fluid and Electrolyte Balance  Outcome: Progressing  Goal: Absence of Infection Signs and Symptoms  Outcome: Progressing  Goal: Optimize Nutrition Status  Outcome: Progressing  Goal: Optimal Pain Control and Function  Outcome: Progressing     Problem: Pain Acute  Goal: Optimal Pain Control and Function  Outcome: Progressing  Intervention: Prevent or Manage Pain  Recent Flowsheet Documentation  Taken 10/29/2024 0800 by Kim Veliz RN  Medication Review/Management: medications reviewed         Plan of Care Reviewed With: patient    Overall Patient Progress: improvingOverall Patient Progress: improving    Outcome Evaluation: Denies pain and nausea. Advanced diet.

## 2024-10-30 ENCOUNTER — APPOINTMENT (OUTPATIENT)
Dept: GENERAL RADIOLOGY | Facility: CLINIC | Age: 74
DRG: 336 | End: 2024-10-30
Attending: INTERNAL MEDICINE
Payer: COMMERCIAL

## 2024-10-30 LAB
HOLD SPECIMEN: NORMAL
MAGNESIUM SERPL-MCNC: 2.2 MG/DL (ref 1.7–2.3)
POTASSIUM SERPL-SCNC: 4.2 MMOL/L (ref 3.4–5.3)

## 2024-10-30 PROCEDURE — 120N000001 HC R&B MED SURG/OB

## 2024-10-30 PROCEDURE — 250N000013 HC RX MED GY IP 250 OP 250 PS 637: Performed by: PHYSICIAN ASSISTANT

## 2024-10-30 PROCEDURE — 83735 ASSAY OF MAGNESIUM: CPT | Performed by: STUDENT IN AN ORGANIZED HEALTH CARE EDUCATION/TRAINING PROGRAM

## 2024-10-30 PROCEDURE — 36415 COLL VENOUS BLD VENIPUNCTURE: CPT | Performed by: STUDENT IN AN ORGANIZED HEALTH CARE EDUCATION/TRAINING PROGRAM

## 2024-10-30 PROCEDURE — 74019 RADEX ABDOMEN 2 VIEWS: CPT

## 2024-10-30 PROCEDURE — 250N000013 HC RX MED GY IP 250 OP 250 PS 637: Performed by: STUDENT IN AN ORGANIZED HEALTH CARE EDUCATION/TRAINING PROGRAM

## 2024-10-30 PROCEDURE — 250N000011 HC RX IP 250 OP 636: Performed by: INTERNAL MEDICINE

## 2024-10-30 PROCEDURE — 99231 SBSQ HOSP IP/OBS SF/LOW 25: CPT | Performed by: INTERNAL MEDICINE

## 2024-10-30 PROCEDURE — 84132 ASSAY OF SERUM POTASSIUM: CPT | Performed by: STUDENT IN AN ORGANIZED HEALTH CARE EDUCATION/TRAINING PROGRAM

## 2024-10-30 RX ORDER — SODIUM PHOSPHATE,MONO-DIBASIC 19G-7G/118
1 ENEMA (ML) RECTAL ONCE
Status: COMPLETED | OUTPATIENT
Start: 2024-10-30 | End: 2024-10-30

## 2024-10-30 RX ADMIN — SENNOSIDES AND DOCUSATE SODIUM 2 TABLET: 8.6; 5 TABLET ORAL at 08:11

## 2024-10-30 RX ADMIN — POLYETHYLENE GLYCOL 3350 17 G: 17 POWDER, FOR SOLUTION ORAL at 20:53

## 2024-10-30 RX ADMIN — SODIUM PHOSPHATE, DIBASIC AND SODIUM PHOSPHATE, MONOBASIC 1 ENEMA: 7; 19 ENEMA RECTAL at 11:56

## 2024-10-30 RX ADMIN — FAMOTIDINE 20 MG: 10 INJECTION, SOLUTION INTRAVENOUS at 09:00

## 2024-10-30 RX ADMIN — BUPROPION HYDROCHLORIDE 300 MG: 150 TABLET, FILM COATED, EXTENDED RELEASE ORAL at 08:15

## 2024-10-30 RX ADMIN — GABAPENTIN 900 MG: 300 CAPSULE ORAL at 20:55

## 2024-10-30 RX ADMIN — LORAZEPAM 0.5 MG: 0.5 TABLET ORAL at 20:58

## 2024-10-30 RX ADMIN — SERTRALINE 100 MG: 100 TABLET, FILM COATED ORAL at 08:11

## 2024-10-30 RX ADMIN — BUPROPION HYDROCHLORIDE 150 MG: 150 TABLET, FILM COATED, EXTENDED RELEASE ORAL at 20:54

## 2024-10-30 RX ADMIN — POLYETHYLENE GLYCOL 3350 17 G: 17 POWDER, FOR SOLUTION ORAL at 08:11

## 2024-10-30 RX ADMIN — SENNOSIDES AND DOCUSATE SODIUM 2 TABLET: 8.6; 5 TABLET ORAL at 20:58

## 2024-10-30 NOTE — PLAN OF CARE
"Pertinent assessments: A&Ox4. Up ind. VSS. RA. Denies pain & nausea, but abd is distended. BS hyperactive, passing gas. No stools overnight. Tolerating diet. Incision covered & CDI.     Major Shift Events: uneventful    Treatment Plan: await return of bowel function. Tolerate diet advancement    Bedside Nurse: Giselle Velazquez RN                     Problem: Adult Inpatient Plan of Care  Goal: Plan of Care Review  Description: The Plan of Care Review/Shift note should be completed every shift.  The Outcome Evaluation is a brief statement about your assessment that the patient is improving, declining, or no change.  This information will be displayed automatically on your shift  note.  Outcome: Progressing  Flowsheets (Taken 10/30/2024 0642)  Plan of Care Reviewed With: patient  Overall Patient Progress: no change  Goal: Patient-Specific Goal (Individualized)  Description: You can add care plan individualizations to a care plan. Examples of Individualization might be:  \"Parent requests to be called daily at 9am for status\", \"I have a hard time hearing out of my right ear\", or \"Do not touch me to wake me up as it startles  me\".  Outcome: Progressing  Goal: Absence of Hospital-Acquired Illness or Injury  Outcome: Progressing  Intervention: Identify and Manage Fall Risk  Recent Flowsheet Documentation  Taken 10/29/2024 2235 by Giselle Velazquez RN  Safety Promotion/Fall Prevention: safety round/check completed  Intervention: Prevent Skin Injury  Recent Flowsheet Documentation  Taken 10/29/2024 2235 by Giselle Velazquez RN  Body Position: position changed independently  Intervention: Prevent and Manage VTE (Venous Thromboembolism) Risk  Recent Flowsheet Documentation  Taken 10/29/2024 2235 by Giselle Velazquez RN  VTE Prevention/Management: SCDs off (sequential compression devices)  Intervention: Prevent Infection  Recent Flowsheet Documentation  Taken 10/29/2024 2235 by Giselle Velazquez RN  Infection Prevention:   hand hygiene promoted   " equipment surfaces disinfected  Goal: Optimal Comfort and Wellbeing  Outcome: Progressing  Goal: Readiness for Transition of Care  Outcome: Progressing   Goal Outcome Evaluation:      Plan of Care Reviewed With: patient    Overall Patient Progress: no changeOverall Patient Progress: no change

## 2024-10-30 NOTE — PROGRESS NOTES
"[facility]   General Surgery Progress Note          Assessment and Plan:   Assessment:   POD#5 s/p exploratory laparotomy and extensive lysis of adhesions for SBO  Postoperative ileus as expected - resolving (+flatus, +BM w/Fleet)  Firm stool noted throughout colon at time of surgery      Plan:   -Diet: Low-res diet  -Bowel regimen: Miralax, senna, dulcolax/fleets prn  -IV ABX: Rocephin finished  -IV fluids:discontinued   -Activity as tolerated, walk halls 2-3 times per day  -Hospitalist managing medically  -Waiting for her to be less distended and another BM before discharge.     Seen and agree.   Kyle Fofana MD          Interval History:   Sitting up in bed, doing well. Tolerating low-res diet. Not much of an appetite but tolerating food just fine. She is not nauseated, but does feel a bit bloated. She can hear lots of audible bowel sounds. She did have a loose BM after the fleet enema yesterday.         Physical Exam:   Blood pressure 126/69, pulse 78, temperature 98.7  F (37.1  C), temperature source Oral, resp. rate 18, height 1.6 m (5' 3\"), weight 52.2 kg (115 lb 1.6 oz), SpO2 100%.    I/O last 3 completed shifts:  In: 600 [P.O.:600]  Out: -     Abdomen: soft, +mildly distended, NT, +BS  Inc(s) - clean, dry, intact with staples, no erythema, no drainage          Data:     Recent Labs   Lab 10/30/24  0654 10/29/24  0654 10/28/24  0714 10/27/24  0748 10/26/24  0702 10/25/24  0939 10/25/24  0515   NA  --   --   --  141 145  --  141   POTASSIUM 4.2 4.0 4.3 4.5 5.2  --  3.9   CHLORIDE  --   --   --  108* 111*  --  104   CO2  --   --   --  25 25  --  25   ANIONGAP  --   --   --  8 9  --  12   GLC  --   --   --  104* 120* 138* 155*   BUN  --   --   --  10.5 15.8  --  18.2   CR  --   --   --  0.79 0.82  --  0.88   GFRESTIMATED  --   --   --  79 76  --  69   JOSE ANTONIO  --   --   --  8.7* 8.6*  --  9.5     Recent Labs   Lab 10/26/24  0702 10/25/24  0515 10/24/24  1538   WBC 8.4 13.9* 11.4*   HGB 13.0 14.3 " 15.2   HCT 41.1 42.5 43.6   MCV 99 94 93    271 298          Jayme Carl PA-C  Text page [phone] 8am-4pm  After 4pm call [phone]

## 2024-10-30 NOTE — PROGRESS NOTES
[facility]    Medicine Progress Note - Hospitalist Service    Date of Admission:  10/24/2024    Assessment & Plan   Maria Esther Miller is a 74 year old female with history of recurrent SBO (last 2010), melanoma, MDD, JEANNA, breast cancer, and prior bowel perf s/p colectomy. She presented to the ED on 10/24/2024 with abdominal pain and vomiting. ED evaluation suggested possible closed loop bowel obstruction.  She was admitted for cares and seen by surgery. Conservative management was attempted but patient ultimately needed exploratory laparotomy with lysis of adhesions on 10/25/24.      Problem list:     Complete closed loop SBO  Status post exploratory laparoscopy with extensive lysis of adhesions on 10/25/2024  Post op ileus  Rule out recurrent SBO  -Seems a little worse today with more distension and diminished high pitched bowel sounds.   -Obtain abdominal XR, two views  -Diet and pain control per surgery  -Doesn't seem ready for discharge today but will defer to surgery     Possible UTI    -Urinalysis at urgent care was positive for nitrites.  Given ceftriaxone in the ED and this was continued.    -Urine culture grew pan-sensitive E. Coli. Changed Rocephin to Ceftin and completed 5 days of antibiotics in total.      High anion arnold metabolic acidosis  -Resolved with IVF     Leukocytosis  -11.4 on presentation, nichole to 13.9 and then normalized to 8.4 after surgery  -Resolved.             Diet: Low Fiber Diet    DVT Prophylaxis: Pneumatic Compression Devices  Mark Catheter: Not present  Lines: None     Cardiac Monitoring: None  Code Status: Full Code      Clinically Significant Risk Factors                                        Disposition Plan     Medically Ready for Discharge: Anticipated Tomorrow             Eric Almaraz MD  Hospitalist Service  [facility]  Securely message with [employer] (more info)  Text page via [employer] Paging/Directory    ______________________________________________________________________    Interval History   Abdomen is a bit more distended today. Fewer bowel sounds and high pitched.        Physical Exam   Vital Signs: Temp: 98.7  F (37.1  C) Temp src: Oral BP: 126/69 Pulse: 78   Resp: 18 SpO2: 100 % O2 Device: None (Room air)    Weight: 115 lbs 1.6 oz    GENERAL:  Comfortable. Cooperative.  PSYCH: pleasant, oriented, No acute distress.  EYES: PERRLA, Normal conjunctiva.  HEART:  Regular rate and rhythm. No JVD. Pulses normal. No edema.  LUNGS:  Clear to auscultation, normal Respiratory effort.  ABDOMEN:  Soft, no hepatosplenomegaly, few high pitched bowel sounds. Moderate abdominal distension.   EXTREMETIES: No clubbing, cyanosis or ischemia  SKIN:  Dry to touch, No rash.      Medical Decision Making       30 MINUTES SPENT BY ME on the date of service doing chart review, history, exam, documentation & further activities per the note.      Data     I have personally reviewed the following data over the past 24 hrs:    N/A  \   N/A   / N/A     N/A N/A N/A /  N/A   4.2 N/A N/A \       Imaging results reviewed over the past 24 hrs:   No results found for this or any previous visit (from the past 24 hours).  Recent Labs   Lab 10/30/24  0654 10/29/24  0654 10/28/24  0714 10/27/24  0748 10/26/24  0702 10/25/24  0939 10/25/24  0515 10/24/24  1538   WBC  --   --   --   --  8.4  --  13.9* 11.4*   HGB  --   --   --   --  13.0  --  14.3 15.2   MCV  --   --   --   --  99  --  94 93   PLT  --   --   --   --  214  --  271 298   NA  --   --   --  141 145  --  141 138   POTASSIUM 4.2 4.0 4.3 4.5 5.2  --  3.9 4.3   CHLORIDE  --   --   --  108* 111*  --  104 99   CO2  --   --   --  25 25  --  25 21*   BUN  --   --   --  10.5 15.8  --  18.2 14.3   CR  --   --   --  0.79 0.82  --  0.88 0.82   ANIONGAP  --   --   --  8 9  --  12 18*   JOSE ANTONIO  --   --   --  8.7* 8.6*  --  9.5 9.8   GLC  --   --   --  104* 120* 138* 155* 120*   ALBUMIN  --   --   --    --   --   --   --  4.7   PROTTOTAL  --   --   --   --   --   --   --  7.6   BILITOTAL  --   --   --   --   --   --   --  0.4   ALKPHOS  --   --   --   --   --   --   --  80   ALT  --   --   --   --   --   --   --  27   AST  --   --   --   --   --   --   --  43   LIPASE  --   --   --   --   --   --   --  26

## 2024-10-31 ENCOUNTER — APPOINTMENT (OUTPATIENT)
Dept: GENERAL RADIOLOGY | Facility: CLINIC | Age: 74
DRG: 336 | End: 2024-10-31
Attending: PHYSICIAN ASSISTANT
Payer: COMMERCIAL

## 2024-10-31 PROCEDURE — 250N000009 HC RX 250: Performed by: PHYSICIAN ASSISTANT

## 2024-10-31 PROCEDURE — 255N000002 HC RX 255 OP 636: Performed by: PHYSICIAN ASSISTANT

## 2024-10-31 PROCEDURE — 74018 RADEX ABDOMEN 1 VIEW: CPT

## 2024-10-31 PROCEDURE — 99232 SBSQ HOSP IP/OBS MODERATE 35: CPT | Performed by: INTERNAL MEDICINE

## 2024-10-31 PROCEDURE — 250N000013 HC RX MED GY IP 250 OP 250 PS 637: Performed by: PHYSICIAN ASSISTANT

## 2024-10-31 PROCEDURE — 250N000013 HC RX MED GY IP 250 OP 250 PS 637: Performed by: STUDENT IN AN ORGANIZED HEALTH CARE EDUCATION/TRAINING PROGRAM

## 2024-10-31 PROCEDURE — 250N000011 HC RX IP 250 OP 636: Performed by: INTERNAL MEDICINE

## 2024-10-31 PROCEDURE — 120N000001 HC R&B MED SURG/OB

## 2024-10-31 RX ADMIN — LORAZEPAM 0.5 MG: 0.5 TABLET ORAL at 21:36

## 2024-10-31 RX ADMIN — BUPROPION HYDROCHLORIDE 300 MG: 150 TABLET, FILM COATED, EXTENDED RELEASE ORAL at 07:49

## 2024-10-31 RX ADMIN — SENNOSIDES AND DOCUSATE SODIUM 2 TABLET: 8.6; 5 TABLET ORAL at 21:36

## 2024-10-31 RX ADMIN — FAMOTIDINE 20 MG: 10 INJECTION, SOLUTION INTRAVENOUS at 10:57

## 2024-10-31 RX ADMIN — POLYETHYLENE GLYCOL 3350 17 G: 17 POWDER, FOR SOLUTION ORAL at 21:35

## 2024-10-31 RX ADMIN — GABAPENTIN 900 MG: 300 CAPSULE ORAL at 21:37

## 2024-10-31 RX ADMIN — SERTRALINE 100 MG: 100 TABLET, FILM COATED ORAL at 07:46

## 2024-10-31 RX ADMIN — WATER 150 ML: 100 IRRIGANT IRRIGATION at 11:39

## 2024-10-31 RX ADMIN — SENNOSIDES AND DOCUSATE SODIUM 2 TABLET: 8.6; 5 TABLET ORAL at 07:46

## 2024-10-31 RX ADMIN — POLYETHYLENE GLYCOL 3350 17 G: 17 POWDER, FOR SOLUTION ORAL at 07:46

## 2024-10-31 RX ADMIN — BUPROPION HYDROCHLORIDE 150 MG: 150 TABLET, FILM COATED, EXTENDED RELEASE ORAL at 21:35

## 2024-10-31 NOTE — PLAN OF CARE
"Goal Outcome Evaluation:    Pertinent assessments: Pt A&O x4. Up independently in room and walking hallways. Abdomen soft but distended; BS active and passing gas. Denies pain, nausea. PIV patent, saline locked. Miralax and senna (2) given at bedtime.     Major Shift Events: none    Treatment Plan: Awaiting return of bowel function, possible discharge today    Bedside Nurse: Kalee Schulte RN      Plan of Care Reviewed With: patient    Overall Patient Progress: improvingOverall Patient Progress: improving    Outcome Evaluation: Stomach soft but distended. Independent in room and hallways. Miralax and senna (2) given at bedtime.      Problem: Adult Inpatient Plan of Care  Goal: Plan of Care Review  Description: The Plan of Care Review/Shift note should be completed every shift.  The Outcome Evaluation is a brief statement about your assessment that the patient is improving, declining, or no change.  This information will be displayed automatically on your shift  note.  Outcome: Progressing  Flowsheets (Taken 10/31/2024 0645)  Outcome Evaluation: Stomach soft but distended. Independent in room and hallways. Miralax and senna (2) given at bedtime.  Plan of Care Reviewed With: patient  Overall Patient Progress: improving  Goal: Patient-Specific Goal (Individualized)  Description: You can add care plan individualizations to a care plan. Examples of Individualization might be:  \"Parent requests to be called daily at 9am for status\", \"I have a hard time hearing out of my right ear\", or \"Do not touch me to wake me up as it startles  me\".  Outcome: Progressing  Goal: Absence of Hospital-Acquired Illness or Injury  Outcome: Progressing  Intervention: Identify and Manage Fall Risk  Recent Flowsheet Documentation  Taken 10/30/2024 2055 by Kalee Schulte, RN  Safety Promotion/Fall Prevention:   assistive device/personal items within reach   clutter free environment maintained   nonskid shoes/slippers when out of bed   safety " round/check completed  Intervention: Prevent Skin Injury  Recent Flowsheet Documentation  Taken 10/30/2024 2055 by Kalee Schulte RN  Body Position: position changed independently  Intervention: Prevent and Manage VTE (Venous Thromboembolism) Risk  Recent Flowsheet Documentation  Taken 10/30/2024 2055 by Kalee Schulte RN  VTE Prevention/Management: SCDs off (sequential compression devices)  Intervention: Prevent Infection  Recent Flowsheet Documentation  Taken 10/30/2024 2055 by Kalee Schulte RN  Infection Prevention:   hand hygiene promoted   rest/sleep promoted   single patient room provided  Goal: Optimal Comfort and Wellbeing  Outcome: Progressing  Intervention: Monitor Pain and Promote Comfort  Recent Flowsheet Documentation  Taken 10/30/2024 2055 by Kalee Schulte RN  Pain Management Interventions: declines  Goal: Readiness for Transition of Care  Outcome: Progressing     Problem: Intestinal Obstruction  Goal: Optimal Bowel Function  Outcome: Progressing  Intervention: Promote Bowel Function  Recent Flowsheet Documentation  Taken 10/30/2024 2055 by Kalee Schulte RN  Body Position: position changed independently  Head of Bed (HOB) Positioning: HOB at 30-45 degrees  Positioning/Transfer Devices:   pillows   in use  Goal: Fluid and Electrolyte Balance  Outcome: Progressing  Goal: Absence of Infection Signs and Symptoms  Outcome: Progressing  Goal: Optimize Nutrition Status  Outcome: Progressing  Goal: Optimal Pain Control and Function  Outcome: Progressing  Intervention: Prevent or Manage Pain  Recent Flowsheet Documentation  Taken 10/30/2024 2055 by Kalee Schulte RN  Pain Management Interventions: declines  Sleep/Rest Enhancement: room darkened     Problem: Pain Acute  Goal: Optimal Pain Control and Function  Outcome: Progressing  Intervention: Develop Pain Management Plan  Recent Flowsheet Documentation  Taken 10/30/2024 2055 by Kalee Schulte RN  Pain Management Interventions:  declines  Intervention: Prevent or Manage Pain  Recent Flowsheet Documentation  Taken 10/30/2024 2055 by Kalee Schulte, RN  Sleep/Rest Enhancement: room darkened  Medication Review/Management: medications reviewed

## 2024-10-31 NOTE — PROGRESS NOTES
"Surgery progress note:     S: doing well this morning. Ate mac and cheese for dinner and breakfast this morning. Drinking lots of water. Passed gas yesterday and had small BM after enema yesterday. No bowel movement this morning. Denies abdominal pain.     O:   /60 (BP Location: Right arm)   Pulse 70   Temp 98  F (36.7  C) (Oral)   Resp 20   Ht 1.6 m (5' 3\")   Wt 52.2 kg (115 lb 1.6 oz)   SpO2 98%   BMI 20.39 kg/m    Gen: appears well, standing up and walking in the room   Abd: soft, continues with mild-moderate distension, not significantly tender, incision intact and clean without signs of infection     Abdominal XR from yesterday reviewed concerning for ileus     A/P:   -clinically doing fairly well although not with robust return of bowel function yet. XR concerning for ileus. Will perform gastrografin challenge today to better delineate anatomy and hopefully help stimulate gut function. Ok for diet for now but advised her to take it slow and stop if she feels nauseated.     Kyle Fofana MD    "

## 2024-10-31 NOTE — PROGRESS NOTES
[facility]    Medicine Progress Note - Hospitalist Service    Date of Admission:  10/24/2024    Assessment & Plan   Maria Esther Miller is a 74 year old female with history of recurrent SBO (last 2010), melanoma, MDD, JEANNA, breast cancer, and prior bowel perf s/p colectomy. She presented to the ED on 10/24/2024 with abdominal pain and vomiting. ED evaluation suggested possible closed loop bowel obstruction.  She was admitted for cares and seen by surgery. Conservative management was attempted but patient ultimately needed exploratory laparotomy with lysis of adhesions on 10/25/24. Post op course was complicated by prolonged ileus.      Problem list:     Complete closed loop SBO  Status post exploratory laparoscopy with extensive lysis of adhesions on 10/25/2024  Post op ileus, prolonted  Rule out recurrent SBO  -Seems a little worse today with more distension and no bowel sounds.   -Gastrograffin challenge per surgery  -Diet and pain control per surgery  -Defer discharge plans to surgery     Possible UTI    -Urinalysis at urgent care was positive for nitrites.  Given ceftriaxone in the ED and this was continued.    -Urine culture grew pan-sensitive E. Coli. Changed Rocephin to Ceftin and completed 5 days of antibiotics in total.      High anion gap metabolic acidosis  -Resolved with IVF     Leukocytosis  -11.4 on presentation, nichole to 13.9 and then normalized to 8.4 after surgery  -Resolved.          Diet: Low Fiber Diet    DVT Prophylaxis: Pneumatic Compression Devices  Mark Catheter: Not present  Lines: None     Cardiac Monitoring: None  Code Status: Full Code      Clinically Significant Risk Factors                                        Disposition Plan     Medically Ready for Discharge: Anticipated in 2-4 Days             Eric Almaraz MD  Hospitalist Service  [facility]  Securely message with "[employer] (more info)  Text page via [facility] Paging/Directory    ______________________________________________________________________    Interval History   No bowel sounds today. Abdomen feels a bit more distended today.     Physical Exam   Vital Signs: Temp: 98  F (36.7  C) Temp src: Oral BP: 110/60 Pulse: 70   Resp: 20 SpO2: 98 % O2 Device: None (Room air)    Weight: 115 lbs 1.6 oz    GENERAL:  Comfortable. Cooperative.  PSYCH: pleasant, oriented, No acute distress.  EYES: PERRLA, Normal conjunctiva.  HEART:  Regular rate and rhythm. No JVD. Pulses normal. No edema.  LUNGS:  Clear to auscultation, normal Respiratory effort.  ABDOMEN:  Soft, no hepatosplenomegaly, no bowel sounds. Moderate abdominal distension.   EXTREMETIES: No clubbing, cyanosis or ischemia  SKIN:  Dry to touch, No rash.      Medical Decision Making       35 MINUTES SPENT BY ME on the date of service doing chart review, history, exam, documentation & further activities per the note.      Data         Imaging results reviewed over the past 24 hrs:   No results found for this or any previous visit (from the past 24 hours).  Recent Labs   Lab 10/30/24  0654 10/29/24  0654 10/28/24  0714 10/27/24  0748 10/26/24  0702 10/25/24  0939 10/25/24  0515   WBC  --   --   --   --  8.4  --  13.9*   HGB  --   --   --   --  13.0  --  14.3   MCV  --   --   --   --  99  --  94   PLT  --   --   --   --  214  --  271   NA  --   --   --  141 145  --  141   POTASSIUM 4.2 4.0 4.3 4.5 5.2  --  3.9   CHLORIDE  --   --   --  108* 111*  --  104   CO2  --   --   --  25 25  --  25   BUN  --   --   --  10.5 15.8  --  18.2   CR  --   --   --  0.79 0.82  --  0.88   ANIONGAP  --   --   --  8 9  --  12   JOSE ANTONIO  --   --   --  8.7* 8.6*  --  9.5   GLC  --   --   --  104* 120* 138* 155*

## 2024-10-31 NOTE — PLAN OF CARE
"End of Shift Summary   For vital signs and complete assessments, please see documentation flowsheets.     Pertinent assessments: pt is a/o indep walking in halls.  Ab distended BS active.  Enema given small amounts of loose stool noted.  pt is passing gas. Denies pain/N/V. Diet changed to several small meals daily due to lack of appetite.    Major Shift Events: uneventful    Treatment Plan:Poss discharge tomorrow.   Bedside Nurse: Radha Harmon RN    Problem: Adult Inpatient Plan of Care  Goal: Plan of Care Review  Description: The Plan of Care Review/Shift note should be completed every shift.  The Outcome Evaluation is a brief statement about your assessment that the patient is improving, declining, or no change.  This information will be displayed automatically on your shift  note.  Outcome: Progressing  Flowsheets (Taken 10/30/2024 2719)  Outcome Evaluation: ab distention small freq meals.  3 small loose stools after enema.  Plan of Care Reviewed With: patient  Overall Patient Progress: improving  Goal: Patient-Specific Goal (Individualized)  Description: You can add care plan individualizations to a care plan. Examples of Individualization might be:  \"Parent requests to be called daily at 9am for status\", \"I have a hard time hearing out of my right ear\", or \"Do not touch me to wake me up as it startles  me\".  Outcome: Progressing  Goal: Absence of Hospital-Acquired Illness or Injury  Outcome: Progressing  Intervention: Identify and Manage Fall Risk  Recent Flowsheet Documentation  Taken 10/30/2024 0843 by Radha Harmon RN  Safety Promotion/Fall Prevention:   room near nurse's station   nonskid shoes/slippers when out of bed   clutter free environment maintained   safety round/check completed   supervised activity  Intervention: Prevent Skin Injury  Recent Flowsheet Documentation  Taken 10/30/2024 0843 by Radha Harmon RN  Body Position: position changed independently  Intervention: Prevent and " Manage VTE (Venous Thromboembolism) Risk  Recent Flowsheet Documentation  Taken 10/30/2024 0843 by Radha Harmon RN  VTE Prevention/Management: SCDs off (sequential compression devices)  Goal: Optimal Comfort and Wellbeing  Outcome: Progressing  Goal: Readiness for Transition of Care  Outcome: Progressing     Problem: Intestinal Obstruction  Goal: Optimal Bowel Function  Outcome: Progressing  Intervention: Promote Bowel Function  Recent Flowsheet Documentation  Taken 10/30/2024 0843 by Radha Harmon RN  Body Position: position changed independently  Head of Bed (HOB) Positioning: HOB at 20-30 degrees  Positioning/Transfer Devices:   pillows   in use  Goal: Fluid and Electrolyte Balance  Outcome: Progressing  Goal: Absence of Infection Signs and Symptoms  Outcome: Progressing  Goal: Optimize Nutrition Status  Outcome: Progressing  Goal: Optimal Pain Control and Function  Outcome: Progressing     Problem: Pain Acute  Goal: Optimal Pain Control and Function  Outcome: Progressing  Intervention: Prevent or Manage Pain  Recent Flowsheet Documentation  Taken 10/30/2024 0843 by Radha Harmon RN  Medication Review/Management: medications reviewed

## 2024-10-31 NOTE — CONSULTS
"CLINICAL NUTRITION SERVICES  -  ASSESSMENT NOTE      Recommendations:   - Diet per Surgery team.  Briefly walked through low fiber education with patient verbally (nurse had given handout which was in room).  - Maria Esther politely declined oral supplement offering, prefers food focus.     MALNUTRITION:  % Weight Loss:  None noted  % Intake:  <75% for > 7 days (moderate malnutrition)  Subcutaneous Fat Loss:  Upper arm region mild depletion --> not using as indicator with only 1 region present   Muscle Loss:  Temporal region mild to moderate depletion, Clavicle bone region moderate depletion, and Acromion bone region moderate depletion --> not using as indicator at this time as suspect age-related/sarcopenia changes and no decline in PO intakes or wt PTA  Fluid Retention: None documented    Malnutrition Diagnosis: Patient does not meet two of the above criteria necessary for diagnosing malnutrition          REASON FOR ASSESSMENT  Maria Esther Miller is a 74 year old female seen by Registered Dietitian for length of stay.    PMH of: Recurrent SBO, melanoma, MDD, breast CA, prior bowel perf s/p colectomy.    Admit 2/2: SBO requiring exploratory lap with extensive lysis of adhesions on 10/25, UTI.    NUTRITION HISTORY  - Information obtained from patient and chart.  - Diet at home: Meals or supplements TID.  Likes a protein bar in the AMs, lunch may be a protein supplement or small meal, dinner described as largest.  Tends to focus on high fruit/vegetable and overall fiber intake + consistent protein.    - Denies low appetite or changes to eating PTA.  - Allergies: NKFA.      CURRENT NUTRITION ORDERS  Diet Order:     Low fiber    Current Intake/Tolerance:  Post-op diet advanced to clears 10/27, fulls 10/28, and solids 10/29.  % intakes per flowsheets.  Per surgery note this AM planning GGC today.    Offered oral supplements which were politely declined.       ANTHROPOMETRICS  Height: 5' 3\"  Weight: 115 lbs 1.6 oz  Body mass " "index is 20.39 kg/m .  Weight Status:  Normal BMI  Weight History:  Wt Readings from Last 10 Encounters:   10/25/24 52.2 kg (115 lb 1.6 oz)   12/15/11 57.5 kg (126 lb 12.8 oz)   09/01/11 55.3 kg (122 lb)     - No current documentation of edema.  - Wt of 118# from 1/24/2024.    - Maria Esther confirms wt has been stable.    LABS: Reviewed.    MEDICATIONS: Reviewed.    GI: Stooling patterns noted w/ recorded BM on 10/29.    SKIN: No current documentation of PI.       ASSESSED NUTRITION NEEDS PER APPROVED PRACTICE GUIDELINES:    Dosing Weight 52 kg  Estimated Energy Needs: 25-30 Kcal/Kg  Justification: maintenance  Estimated Protein Needs: >/=1.2 g pro/Kg  Justification: preservation of lean body mass, post-op  Estimated Fluid Needs: per MD      NUTRITION DIAGNOSIS:  Inadequate oral intake related to diet advancement post-op as evidenced by meeting <75% of nutrition needs acutely.    NUTRITION INTERVENTIONS  Recommendations / Nutrition Prescription  See above.    Implementation  Nutrition education: Provided education on above.    Nutrition goals:  PO intakes of at least 50-75% of meals or snacks TID.     MONITORING AND EVALUATION:  Progress towards goals will be monitored and evaluated per protocol and Practice Guidelines          Kaitlin Gordon RDN, , LD  Clinical Dietitian  [facility] Message Group: \"Dietitian [Chance]\"  Office: [phone]  Pagers:  3rd floor/ICU: [phone]  All other floors: [phone]  Weekend/holiday: [phone]    "

## 2024-11-01 LAB
ANION GAP SERPL CALCULATED.3IONS-SCNC: 11 MMOL/L (ref 7–15)
BUN SERPL-MCNC: 8.2 MG/DL (ref 8–23)
CALCIUM SERPL-MCNC: 8.5 MG/DL (ref 8.8–10.4)
CHLORIDE SERPL-SCNC: 108 MMOL/L (ref 98–107)
CREAT SERPL-MCNC: 0.94 MG/DL (ref 0.51–0.95)
EGFRCR SERPLBLD CKD-EPI 2021: 64 ML/MIN/1.73M2
ERYTHROCYTE [DISTWIDTH] IN BLOOD BY AUTOMATED COUNT: 12.5 % (ref 10–15)
GLUCOSE SERPL-MCNC: 103 MG/DL (ref 70–99)
HCO3 SERPL-SCNC: 24 MMOL/L (ref 22–29)
HCT VFR BLD AUTO: 37.3 % (ref 35–47)
HGB BLD-MCNC: 11.8 G/DL (ref 11.7–15.7)
MCH RBC QN AUTO: 30.8 PG (ref 26.5–33)
MCHC RBC AUTO-ENTMCNC: 31.6 G/DL (ref 31.5–36.5)
MCV RBC AUTO: 97 FL (ref 78–100)
PLATELET # BLD AUTO: 263 10E3/UL (ref 150–450)
POTASSIUM SERPL-SCNC: 3.8 MMOL/L (ref 3.4–5.3)
RBC # BLD AUTO: 3.83 10E6/UL (ref 3.8–5.2)
SODIUM SERPL-SCNC: 143 MMOL/L (ref 135–145)
WBC # BLD AUTO: 6.2 10E3/UL (ref 4–11)

## 2024-11-01 PROCEDURE — 80048 BASIC METABOLIC PNL TOTAL CA: CPT | Performed by: INTERNAL MEDICINE

## 2024-11-01 PROCEDURE — 120N000001 HC R&B MED SURG/OB

## 2024-11-01 PROCEDURE — 36415 COLL VENOUS BLD VENIPUNCTURE: CPT | Performed by: INTERNAL MEDICINE

## 2024-11-01 PROCEDURE — 250N000011 HC RX IP 250 OP 636: Performed by: INTERNAL MEDICINE

## 2024-11-01 PROCEDURE — 250N000013 HC RX MED GY IP 250 OP 250 PS 637: Performed by: PHYSICIAN ASSISTANT

## 2024-11-01 PROCEDURE — 99232 SBSQ HOSP IP/OBS MODERATE 35: CPT | Performed by: HOSPITALIST

## 2024-11-01 PROCEDURE — 250N000013 HC RX MED GY IP 250 OP 250 PS 637: Performed by: STUDENT IN AN ORGANIZED HEALTH CARE EDUCATION/TRAINING PROGRAM

## 2024-11-01 PROCEDURE — 85027 COMPLETE CBC AUTOMATED: CPT | Performed by: INTERNAL MEDICINE

## 2024-11-01 RX ADMIN — SENNOSIDES AND DOCUSATE SODIUM 1 TABLET: 8.6; 5 TABLET ORAL at 08:22

## 2024-11-01 RX ADMIN — FAMOTIDINE 20 MG: 10 INJECTION, SOLUTION INTRAVENOUS at 10:20

## 2024-11-01 RX ADMIN — BUPROPION HYDROCHLORIDE 150 MG: 150 TABLET, FILM COATED, EXTENDED RELEASE ORAL at 21:03

## 2024-11-01 RX ADMIN — SERTRALINE 100 MG: 100 TABLET, FILM COATED ORAL at 08:23

## 2024-11-01 RX ADMIN — BUPROPION HYDROCHLORIDE 300 MG: 150 TABLET, FILM COATED, EXTENDED RELEASE ORAL at 08:22

## 2024-11-01 RX ADMIN — GABAPENTIN 900 MG: 300 CAPSULE ORAL at 21:04

## 2024-11-01 RX ADMIN — LORAZEPAM 0.5 MG: 0.5 TABLET ORAL at 21:04

## 2024-11-01 NOTE — PROGRESS NOTES
[facility]    Hospitalist Progress Note      Assessment & Plan   Maria Esther Miller is a 74 year old female with history of recurrent SBO (last 2010), melanoma, MDD, JEANNA, breast cancer, and prior bowel perf s/p colectomy. She presented to the ED on 10/24/2024 with abdominal pain and vomiting. ED evaluation suggested possible closed loop bowel obstruction.  She was admitted for cares and seen by surgery. Conservative management was attempted but patient ultimately needed exploratory laparotomy with lysis of adhesions on 10/25/24. Post op course was complicated by prolonged ileus.     #Complete closed loop SBO s/p exploratory laparoscopy with extensive lysis of adhesions on 10/25/2024.  Post op ileus, prolonged  -Gastrografin study on 10/31 showed contrast through colon.  Symptoms better on 11/1.  Passing gas and having loose BM's.  No nausea.  Tolerating low fiber diet.    -Diet and pain control per surgery  -Defer discharge plans to surgery.  Sound like monitoring for 1 more day in hospital and possible discharge tomorrow.     Possible UTI    -Urinalysis at urgent care was positive for nitrites.  Given ceftriaxone in the ED and this was continued.    -Urine culture grew pan-sensitive E. Coli. Changed Rocephin to Ceftin and completed 5 days of antibiotics in total.      High anion gap metabolic acidosis  -Resolved with IVF     Leukocytosis  -11.4 on presentation, nichole to 13.9 and then normalized to 8.4 after surgery  -Resolved.    DVT Prophylaxis: Pneumatic Compression Devices  Code Status: Full Code  Medically Ready for Discharge: Anticipated Tomorrow    I offered to call family to provide update but patient prefers to update herself.     Hermilo Cruz MD    Interval History   No events overnight.  No AP that is worsening.  Having loose BM's. No blood. No CP, SOB, fevers/chills.     -Data reviewed today: I reviewed all new labs and imaging results over the last 24 hours. I personally reviewed      Physical Exam   Temp: 98.2  F (36.8  C) Temp src: Oral BP: 120/64 Pulse: 71   Resp: 20 SpO2: 100 % O2 Device: None (Room air)    Vitals:    10/24/24 1513 10/25/24 0305   Weight: 52.7 kg (116 lb 2.9 oz) 52.2 kg (115 lb 1.6 oz)     Vital Signs with Ranges  Temp:  [97.9  F (36.6  C)-98.8  F (37.1  C)] 98.2  F (36.8  C)  Pulse:  [59-73] 71  Resp:  [18-20] 20  BP: ()/(56-74) 120/64  SpO2:  [94 %-100 %] 100 %  No intake/output data recorded.    Constitutional: Nontoxic, NAD  HEENT: Normocephalic. MMM, No elevation of JVD noted.   Respiratory: Nl WOB, Clear bilaterally, No wheezes or crackles  Cardiovascular: Regular, no murmur  GI: BS+, Soft. Some mild tenderness but no rebound or guarding.   Skin/Integumen: WWP, no rash. No edema  Neuro: CNII-XII intact. Moves all extremities. No tremor. A&Ox3.    Medications   Current Facility-Administered Medications   Medication Dose Route Frequency Provider Last Rate Last Admin     Current Facility-Administered Medications   Medication Dose Route Frequency Provider Last Rate Last Admin    [Held by provider] atorvastatin (LIPITOR) tablet 20 mg  20 mg Oral QPM Brenda Soto PA-C        buPROPion (WELLBUTRIN SR) 12 hr tablet 150 mg  150 mg Oral QPM Brenda Soto PA-C   150 mg at 10/31/24 2135    buPROPion (WELLBUTRIN SR) 12 hr tablet 300 mg  300 mg Oral QAM Brenda Soto PA-C   300 mg at 11/01/24 0822    famotidine (PEPCID) injection 20 mg  20 mg Intravenous Q24H Luke Lea MD   20 mg at 11/01/24 1020    gabapentin (NEURONTIN) capsule 900 mg  900 mg Oral At Bedtime Kyle Fofana MD   900 mg at 10/31/24 2137    LORazepam (ATIVAN) tablet 0.5 mg  0.5 mg Oral At Bedtime Kyle Fofana MD   0.5 mg at 10/31/24 2136    polyethylene glycol (MIRALAX) Packet 17 g  17 g Oral BID Jayme Carl PA-C   17 g at 10/31/24 2135    senna-docusate (SENOKOT-S/PERICOLACE) 8.6-50 MG per tablet 1 tablet  1 tablet Oral BID Jayme Carl  JULIANNE Polk   1 tablet at 11/01/24 0822    Or    senna-docusate (SENOKOT-S/PERICOLACE) 8.6-50 MG per tablet 2 tablet  2 tablet Oral BID SiddharthaJayme PA-C   2 tablet at 10/31/24 2136    sertraline (ZOLOFT) tablet 100 mg  100 mg Oral Daily Brenda Soto PA-C   100 mg at 11/01/24 0823    sodium chloride (PF) 0.9% PF flush 3 mL  3 mL Intracatheter Q8H Kyle Fofana MD   3 mL at 11/01/24 0825       Data   Recent Labs   Lab 11/01/24  0618 10/30/24  0654 10/29/24  0654 10/28/24  0714 10/27/24  0748 10/26/24  0702   WBC 6.2  --   --   --   --  8.4   HGB 11.8  --   --   --   --  13.0   MCV 97  --   --   --   --  99     --   --   --   --  214     --   --   --  141 145   POTASSIUM 3.8 4.2 4.0   < > 4.5 5.2   CHLORIDE 108*  --   --   --  108* 111*   CO2 24  --   --   --  25 25   BUN 8.2  --   --   --  10.5 15.8   CR 0.94  --   --   --  0.79 0.82   ANIONGAP 11  --   --   --  8 9   JOSE ANTONIO 8.5*  --   --   --  8.7* 8.6*   *  --   --   --  104* 120*    < > = values in this interval not displayed.       Recent Results (from the past 24 hours)   XR Gastrografin Challenge    Narrative    EXAM: XR GASTROGRAFIN CHALLENGE  LOCATION: [facility]  DATE: 10/31/2024    INDICATION: Small Bowel Obstruction  COMPARISON: 10/30/2024  TECHNIQUE: Routine water soluble contrast follow-through challenge.      Impression    IMPRESSION:  1.  Water soluble contrast material IS identified within the colon 8 hours post administration.  2.  Small bowel loops are no longer dilated. Midline skin staples now evident.

## 2024-11-01 NOTE — PROGRESS NOTES
"Surgery progress note:     Doing well this morning and feeling better and less distended. Had several loose bowel movements. Eating well. No nausea or vomiting.     O:   /66 (BP Location: Right arm)   Pulse 71   Temp 98.3  F (36.8  C) (Oral)   Resp 20   Ht 1.6 m (5' 3\")   Wt 52.2 kg (115 lb 1.6 oz)   SpO2 99%   BMI 20.39 kg/m    Generally she appears well and her abdomen is appropriately tender and less distended. The incision is c/d/I.     Gastrografin challenge reviewed by me showing contrast reaching the colon     A/P:   -anticipate discharge tomorrow   -regular diet, fiber is ok, no restrictions   -ok to go on walks and hikes on discharge, avoid vigorous activity for 2 weeks, follow-up in clinic in 1-2 weeks for staple removal     Kyle Fofana MD    "

## 2024-11-01 NOTE — PROGRESS NOTES
End of Shift Summary:   For vital signs and complete assessments, please see documentation flowsheets.     Pertinent Assessments:   VSS. Patient A&Ox4. Patient denied pain. Independent with ambulation. Walking around unit most of shift. Patient reported 4 small loose stools during shift. BS hypoactive. Incision well approximated, no drainage, staples intact. Low-fiber diet, good appetite    Major Shift Events: None     Treatment Plan:   Pain and symptom management

## 2024-11-01 NOTE — PLAN OF CARE
"End of Shift Summary  For vital signs and complete assessments, please see documentation flowsheets.     Pertinent assessments: pt is a/o indep ambulating in hallways.  Gastrografin challenge today will have XR at 1940 pt has remained NPO for 8 hours.  One small loose/soft stool this shift.  Denies pain. BS hypoactive.  Incision staples intact well approx no drainage.    Major Shift Events: none    Treatment Plan: Awaiting return of bowel function gastrografin study today.    Bedside Nurse: Radha Harmon RN  Problem: Adult Inpatient Plan of Care  Goal: Plan of Care Review  Description: The Plan of Care Review/Shift note should be completed every shift.  The Outcome Evaluation is a brief statement about your assessment that the patient is improving, declining, or no change.  This information will be displayed automatically on your shift  note.  Outcome: Progressing  Flowsheets (Taken 10/31/2024 1932)  Outcome Evaluation: gastrografin challange today.  small soft stool still ambulating the hallways.  Goal: Patient-Specific Goal (Individualized)  Description: You can add care plan individualizations to a care plan. Examples of Individualization might be:  \"Parent requests to be called daily at 9am for status\", \"I have a hard time hearing out of my right ear\", or \"Do not touch me to wake me up as it startles  me\".  Outcome: Progressing  Goal: Absence of Hospital-Acquired Illness or Injury  Outcome: Progressing  Intervention: Identify and Manage Fall Risk  Recent Flowsheet Documentation  Taken 10/31/2024 0800 by Radha Harmon RN  Safety Promotion/Fall Prevention:   room near nurse's station   nonskid shoes/slippers when out of bed   clutter free environment maintained   safety round/check completed   supervised activity  Intervention: Prevent Skin Injury  Recent Flowsheet Documentation  Taken 10/31/2024 0800 by Radha Harmon RN  Body Position: position changed independently  Intervention: Prevent and Manage VTE " (Venous Thromboembolism) Risk  Recent Flowsheet Documentation  Taken 10/31/2024 0800 by Radha Harmon RN  VTE Prevention/Management: SCDs off (sequential compression devices)  Intervention: Prevent Infection  Recent Flowsheet Documentation  Taken 10/31/2024 0800 by Radha Harmon RN  Infection Prevention:   hand hygiene promoted   equipment surfaces disinfected  Goal: Optimal Comfort and Wellbeing  Outcome: Progressing  Goal: Readiness for Transition of Care  Outcome: Progressing     Problem: Intestinal Obstruction  Goal: Optimal Bowel Function  Outcome: Progressing  Intervention: Promote Bowel Function  Recent Flowsheet Documentation  Taken 10/31/2024 0800 by Radha Harmon RN  Body Position: position changed independently  Head of Bed (HOB) Positioning: HOB at 20-30 degrees  Positioning/Transfer Devices:   pillows   in use  Goal: Fluid and Electrolyte Balance  Outcome: Progressing  Goal: Absence of Infection Signs and Symptoms  Outcome: Progressing  Goal: Optimize Nutrition Status  Outcome: Progressing  Goal: Optimal Pain Control and Function  Outcome: Progressing     Problem: Pain Acute  Goal: Optimal Pain Control and Function  Outcome: Progressing  Intervention: Prevent or Manage Pain  Recent Flowsheet Documentation  Taken 10/31/2024 0800 by Radha Harmon RN  Medication Review/Management: medications reviewed

## 2024-11-01 NOTE — CONSULTS
SPIRITUAL HEALTH SERVICES  SPIRITUAL ASSESSMENT \consult Note  [facility]. [address]     REFERRAL SOURCE: Introductory visit; needs assessment    Brief visit with Maria Esther who expressed appreciation for volunteer Eucharistic 's visit  and communion this morning. No other needs at this time.    Spiritual Health remains available at patient's request for the duration of admission.     Yanet Smalls MDiv [facility]  Staff   Please place consult order for routine Spiritual Health Services referrals.  [facility] available 24/7 for emergent requests either by having the on-call  paged or by entering an ASAP/STAT consult in Epic (this will also page the on-call ).

## 2024-11-01 NOTE — PLAN OF CARE
"Goal Outcome Evaluation:    End of Shift Summary:   For vital signs and complete assessments, please see documentation flowsheets.     Pertinent Assessments:   VSS. Patient A&Ox4. Patient denied pain. Independent with ambulation. Walking around unit most of shift. Patient reported 4 small loose stools during shift. BS hypoactive. Incision well approximated, no drainage, staples intact. Low-fiber diet, good appetite    Major Shift Events: None     Treatment Plan:   Pain and symptom management       Plan of Care Reviewed With: patient    Overall Patient Progress: improving    Problem: Adult Inpatient Plan of Care  Goal: Patient-Specific Goal (Individualized)  Description: You can add care plan individualizations to a care plan. Examples of Individualization might be:  \"Parent requests to be called daily at 9am for status\", \"I have a hard time hearing out of my right ear\", or \"Do not touch me to wake me up as it startles  me\".  11/1/2024 1808 by Bozena Crisostomo  Outcome: Progressing  11/1/2024 1807 by Bozena Crisostomo  Reactivated  11/1/2024 1805 by Bozena Crisostomo  Outcome: Met  Goal: Absence of Hospital-Acquired Illness or Injury  11/1/2024 1808 by Bozena Crisostomo  Outcome: Progressing  11/1/2024 1807 by Bozena Crisostomo  Reactivated  11/1/2024 1805 by Bozena Crisostomo  Outcome: Met  Intervention: Identify and Manage Fall Risk  Recent Flowsheet Documentation  Taken 11/1/2024 0900 by Bozena Crisostomo  Safety Promotion/Fall Prevention: safety round/check completed  Intervention: Prevent Skin Injury  Recent Flowsheet Documentation  Taken 11/1/2024 0900 by Bozena Crisostomo  Body Position: position changed independently  Intervention: Prevent and Manage VTE (Venous Thromboembolism) Risk  Recent Flowsheet Documentation  Taken 11/1/2024 0900 by Bozena Crisostomo  VTE Prevention/Management: SCDs off (sequential compression devices)  Intervention: Prevent Infection  Recent Flowsheet Documentation  Taken 11/1/2024 0900 by Bozena Crisostomo  Infection Prevention:   " rest/sleep promoted   hand hygiene promoted   single patient room provided  Goal: Optimal Comfort and Wellbeing  11/1/2024 1808 by Bozena Crisostomo  Outcome: Progressing  11/1/2024 1807 by Bozena Crisostomo  Reactivated  11/1/2024 1805 by Bozena Crisostomo  Outcome: Met  Goal: Readiness for Transition of Care  11/1/2024 1808 by Bozena Crisostomo  Outcome: Progressing  11/1/2024 1807 by Bozena Crisostomo  Reactivated  11/1/2024 1805 by Bozena Crisostomo  Outcome: Met     Problem: Intestinal Obstruction  Goal: Optimal Bowel Function  11/1/2024 1808 by Bozena Crisostomo  Outcome: Progressing  11/1/2024 1807 by Bozena Crisostomo  Reactivated  11/1/2024 1805 by Bozena Crisostomo  Outcome: Met  Intervention: Promote Bowel Function  Recent Flowsheet Documentation  Taken 11/1/2024 0900 by Bozena Crisostomo  Body Position: position changed independently  Head of Bed (HOB) Positioning: HOB at 20-30 degrees  Positioning/Transfer Devices:   pillows   in use  Goal: Fluid and Electrolyte Balance  11/1/2024 1808 by Bozena Crisostomo  Outcome: Progressing  11/1/2024 1807 by Bozena Crisostomo  Reactivated  11/1/2024 1805 by Bozena Crisostomo  Outcome: Met  Goal: Absence of Infection Signs and Symptoms  11/1/2024 1808 by Bozena Crisostomo  Outcome: Progressing  11/1/2024 1807 by Bozena Crisostomo  Reactivated  11/1/2024 1805 by Bozena Crisostomo  Outcome: Met  Goal: Optimize Nutrition Status  11/1/2024 1808 by Bozena Crisostomo  Outcome: Progressing  11/1/2024 1807 by Bozena Crisostomo  Reactivated  11/1/2024 1805 by Bozena Crisostomo  Outcome: Met  Goal: Optimal Pain Control and Function  11/1/2024 1808 by Bozena Crisostomo  Outcome: Progressing  11/1/2024 1807 by Bozena Crisostomo  Reactivated  11/1/2024 1805 by Boezna Crisostomo  Outcome: Met

## 2024-11-01 NOTE — PLAN OF CARE
"Goal Outcome Evaluation:    Pertinent assessments: Pt A&O x4. VSS on RA. Up independently in room and hallways. Denies pain, nausea. Reported one loose stool this shift. Abdomen less distended; midline incision staples intact and well approximated; PATY. Abdominal X-ray after gastrografin challenge completed. Tolerating low fiber diet, small meals.    Major Shift Events: none    Treatment Plan: Awaiting return of bowel function    Bedside Nurse: Kalee Schulte RN      Plan of Care Reviewed With: patient    Overall Patient Progress: improvingOverall Patient Progress: improving    Outcome Evaluation: Reported one loose stool this shift. Abdomen less distended. Denies pain.      Problem: Adult Inpatient Plan of Care  Goal: Plan of Care Review  Description: The Plan of Care Review/Shift note should be completed every shift.  The Outcome Evaluation is a brief statement about your assessment that the patient is improving, declining, or no change.  This information will be displayed automatically on your shift  note.  Outcome: Progressing  Flowsheets (Taken 11/1/2024 0642)  Outcome Evaluation: Reported one loose stool this shift. Abdomen less distended. Denies pain.  Plan of Care Reviewed With: patient  Overall Patient Progress: improving  Goal: Patient-Specific Goal (Individualized)  Description: You can add care plan individualizations to a care plan. Examples of Individualization might be:  \"Parent requests to be called daily at 9am for status\", \"I have a hard time hearing out of my right ear\", or \"Do not touch me to wake me up as it startles  me\".  Outcome: Progressing  Goal: Absence of Hospital-Acquired Illness or Injury  Outcome: Progressing  Intervention: Identify and Manage Fall Risk  Recent Flowsheet Documentation  Taken 10/31/2024 2137 by Kalee Schulte, RN  Safety Promotion/Fall Prevention:   assistive device/personal items within reach   clutter free environment maintained   nonskid shoes/slippers when out of " bed   safety round/check completed  Intervention: Prevent Skin Injury  Recent Flowsheet Documentation  Taken 10/31/2024 2137 by Kalee Schulte RN  Body Position: position changed independently  Intervention: Prevent and Manage VTE (Venous Thromboembolism) Risk  Recent Flowsheet Documentation  Taken 10/31/2024 2137 by Kalee Schulte RN  VTE Prevention/Management: SCDs off (sequential compression devices)  Intervention: Prevent Infection  Recent Flowsheet Documentation  Taken 10/31/2024 2137 by Kalee Schulte RN  Infection Prevention:   hand hygiene promoted   rest/sleep promoted   single patient room provided  Goal: Optimal Comfort and Wellbeing  Outcome: Progressing  Intervention: Monitor Pain and Promote Comfort  Recent Flowsheet Documentation  Taken 10/31/2024 2137 by Kalee Schulte RN  Pain Management Interventions: medication (see MAR)  Goal: Readiness for Transition of Care  Outcome: Progressing     Problem: Intestinal Obstruction  Goal: Optimal Bowel Function  Outcome: Progressing  Intervention: Promote Bowel Function  Recent Flowsheet Documentation  Taken 10/31/2024 2137 by Kalee Schulte RN  Body Position: position changed independently  Head of Bed (HOB) Positioning: HOB at 20 degrees  Positioning/Transfer Devices:   pillows   in use  Goal: Fluid and Electrolyte Balance  Outcome: Progressing  Goal: Absence of Infection Signs and Symptoms  Outcome: Progressing  Goal: Optimize Nutrition Status  Outcome: Progressing  Goal: Optimal Pain Control and Function  Outcome: Progressing  Intervention: Prevent or Manage Pain  Recent Flowsheet Documentation  Taken 10/31/2024 2137 by Kalee Schulte RN  Pain Management Interventions: medication (see MAR)  Sleep/Rest Enhancement:   room darkened   noise level reduced     Problem: Pain Acute  Goal: Optimal Pain Control and Function  Outcome: Progressing  Intervention: Develop Pain Management Plan  Recent Flowsheet Documentation  Taken 10/31/2024 2137 by Kalee Schulte  RN  Pain Management Interventions: medication (see MAR)  Intervention: Prevent or Manage Pain  Recent Flowsheet Documentation  Taken 10/31/2024 2137 by Kalee Schulte, RN  Sleep/Rest Enhancement:   room darkened   noise level reduced  Medication Review/Management: medications reviewed

## 2024-11-02 VITALS
RESPIRATION RATE: 20 BRPM | OXYGEN SATURATION: 98 % | TEMPERATURE: 98.3 F | SYSTOLIC BLOOD PRESSURE: 112 MMHG | WEIGHT: 115.1 LBS | DIASTOLIC BLOOD PRESSURE: 63 MMHG | HEART RATE: 68 BPM | BODY MASS INDEX: 20.39 KG/M2 | HEIGHT: 63 IN

## 2024-11-02 PROCEDURE — 250N000011 HC RX IP 250 OP 636: Performed by: INTERNAL MEDICINE

## 2024-11-02 PROCEDURE — 99239 HOSP IP/OBS DSCHRG MGMT >30: CPT | Performed by: HOSPITALIST

## 2024-11-02 PROCEDURE — 250N000013 HC RX MED GY IP 250 OP 250 PS 637: Performed by: PHYSICIAN ASSISTANT

## 2024-11-02 RX ADMIN — FAMOTIDINE 20 MG: 10 INJECTION, SOLUTION INTRAVENOUS at 09:44

## 2024-11-02 RX ADMIN — SERTRALINE 100 MG: 100 TABLET, FILM COATED ORAL at 08:17

## 2024-11-02 RX ADMIN — BUPROPION HYDROCHLORIDE 300 MG: 150 TABLET, FILM COATED, EXTENDED RELEASE ORAL at 08:17

## 2024-11-02 ASSESSMENT — ACTIVITIES OF DAILY LIVING (ADL)
ADLS_ACUITY_SCORE: 0

## 2024-11-02 NOTE — DISCHARGE SUMMARY
Discharge Note    Patient discharged to home via private vehicle  accompanied by friend.  IV: Discontinued  Prescriptions printed and given to patient/family.   Belongings reviewed and sent with patient.   Home medications returned to patient: NA  Equipment sent with: patient, N/A.   patient verbalizes understanding of discharge instructions. AVS given to patient.  Additional education completed? Wound Care

## 2024-11-02 NOTE — DISCHARGE SUMMARY
[facility]    Discharge Summary  Hospitalist    Date of Admission:  10/24/2024  Date of Discharge:  11/2/2024  Discharging Provider: Hermilo Cruz MD  Date of Service (when I saw the patient): 11/02/24    Discharge Diagnoses   #Complete closed loop SBO s/p exploratory laparoscopy with extensive lysis of adhesions on 10/25/2024  #Post op ileus, resolve  #Possible UTI    #High anion gap metabolic acidosis  #Leukocytosis    Hospital Course   Maria Esther Miller is a 74 year old female with history of recurrent SBO (last 2010), melanoma, MDD, JEANNA, breast cancer, and prior bowel perf s/p colectomy. She presented to the ED on 10/24/2024 with abdominal pain and vomiting. ED evaluation suggested possible closed loop bowel obstruction.  She was admitted for cares and seen by surgery. Conservative management was attempted but patient ultimately needed exploratory laparotomy with lysis of adhesions on 10/25/24. Post op course was complicated by prolonged ileus.      #Complete closed loop SBO s/p exploratory laparoscopy with extensive lysis of adhesions on 10/25/2024.  Post op ileus, resolved  -Gastrografin study on 10/31 showed contrast through colon.  Symptoms better on 11/1.  Passing gas and having loose BM's.  No nausea.  Tolerating low fiber diet.    -Diet and pain control per surgery  -Pt discharged on 11/2.  She felt well. Tolerating regular diet. No abdominal pain. Having BM's.  No n/v. No CP, SOB,fevers/chills.  She will follow up with surgery in 1-2 weeks for staple removal and PCP for hospital follow up.      Possible UTI    -Urinalysis at urgent care was positive for nitrites.  Given ceftriaxone in the ED and this was continued.    -Urine culture grew pan-sensitive E. Coli. Changed Rocephin to Ceftin and completed 5 days of antibiotics in total.      High anion gap metabolic acidosis  -Resolved with IVF     Leukocytosis  -11.4 on presentation, nichole to 13.9 and then normalized to 8.4 after  surgery  -Resolved.    Hermilo Cruz MD    Code Status   Full Code       Primary Care Physician   [facility]    Physical Exam   Temp: 98.3  F (36.8  C) Temp src: Oral BP: 112/63 Pulse: 68   Resp: 20 SpO2: 98 % O2 Device: None (Room air)    Vitals:    10/24/24 1513 10/25/24 0305   Weight: 52.7 kg (116 lb 2.9 oz) 52.2 kg (115 lb 1.6 oz)     Vital Signs with Ranges  Temp:  [98  F (36.7  C)-98.6  F (37  C)] 98.3  F (36.8  C)  Pulse:  [61-71] 68  Resp:  [16-20] 20  BP: ()/(56-70) 112/63  SpO2:  [96 %-99 %] 98 %  No intake/output data recorded.    Constitutional: Nontoxic, NAD  HEENT: Normocephalic. MMM, No elevation of JVD noted.   Respiratory: Nl WOB, Clear bilaterally, No wheezes or crackles  Cardiovascular: Regular, no murmur  GI: BS+, Soft. No tenderness today  Skin/Integumen: WWP, no rash. No edema  Neuro: CNII-XII intact. Moves all extremities. No tremor. A&Ox3.    Discharge Disposition   Discharged to home  Condition at discharge: Stable    Consultations This Hospital Stay   SURGERY GENERAL IP CONSULT  [facility] IP CONSULT    Time Spent on this Encounter   I, Hermilo Cruz MD, personally saw the patient today and spent greater than 30 minutes discharging this patient.    Discharge Orders      Reason for your hospital stay    You were hospitalized for small bowel obstruction.  You were seen by general surgery and underwent surgical lysis of adhesions.  You had your diet advanced with return of bowel function.  You should follow-up with your PCP in 1 week for hospital follow-up.  You should follow-up with surgery team per their instructions in the coming weeks.     Activity    Your activity upon discharge: activity as tolerated     Follow-up and recommended labs and tests     Follow up with primary care provider, [facility], within 7 days for hospital follow- up.  The following labs/tests are recommended: CBC and BMP.    You will follow up with  surgery in 1-2 weeks for staple removal.     Diet    Follow this diet upon discharge: General diet     Discharge Medications   Current Discharge Medication List        CONTINUE these medications which have NOT CHANGED    Details   atorvastatin (LIPITOR) 20 MG tablet Take 20 mg by mouth every evening.      !! buPROPion (WELLBUTRIN SR) 150 MG 12 hr tablet Take 300 mg by mouth every morning.      !! buPROPion (WELLBUTRIN SR) 150 MG 12 hr tablet Take 150 mg by mouth every evening.      Calcium Carbonate Antacid (CALCIUM CARBONATE PO) Take 2 tablets by mouth daily.      gabapentin (NEURONTIN) 300 MG capsule Take 900 mg by mouth At Bedtime.      LORazepam (ATIVAN) 0.5 MG tablet Take 1 mg by mouth at bedtime.      MULTIPLE VITAMINS PO Take 1 tablet by mouth daily.      Niacin-Polypodium Leucotomos (HELIOCARE ADVANCED PO) Take 2 tablets by mouth daily.      Niacinamide-Zn-Cu-Ugcrot-Bl-Rp (NICOTINAMIDE PO) Take 1 tablet by mouth daily.      sertraline (ZOLOFT) 100 MG tablet Take 100 mg by mouth daily.      tretinoin (RETIN-A) 0.1 % external cream Apply topically at bedtime.       !! - Potential duplicate medications found. Please discuss with provider.        Allergies   No Known Allergies  Data   Most Recent 3 CBC's:  Recent Labs   Lab Test 11/01/24  0618 10/26/24  0702 10/25/24  0515   WBC 6.2 8.4 13.9*   HGB 11.8 13.0 14.3   MCV 97 99 94    214 271      Most Recent 3 BMP's:  Recent Labs   Lab Test 11/01/24  0618 10/30/24  0654 10/29/24  0654 10/28/24  0714 10/27/24  0748 10/26/24  0702     --   --   --  141 145   POTASSIUM 3.8 4.2 4.0   < > 4.5 5.2   CHLORIDE 108*  --   --   --  108* 111*   CO2 24  --   --   --  25 25   BUN 8.2  --   --   --  10.5 15.8   CR 0.94  --   --   --  0.79 0.82   ANIONGAP 11  --   --   --  8 9   JOSE ANTONIO 8.5*  --   --   --  8.7* 8.6*   *  --   --   --  104* 120*    < > = values in this interval not displayed.     Most Recent 2 LFT's:  Recent Labs   Lab Test 10/24/24  1538   AST 43    ALT 27   ALKPHOS 80   BILITOTAL 0.4     Most Recent INR's and Anticoagulation Dosing History:  Anticoagulation Dose History          Latest Ref Rng & Units 11/30/2009 12/9/2009 12/30/2009 1/4/2010 1/11/2010   Recent Dosing and Labs   INR 0.86 - 1.14 1.17  1.25  1.03  1.26  1.12       Details                 Most Recent 3 Troponin's:No lab results found.  Most Recent Cholesterol Panel:No lab results found.  Most Recent 6 Bacteria Isolates From Any Culture (See EPIC Reports for Culture Details):No lab results found.  Most Recent TSH, T4 and A1c Labs:No lab results found.

## 2024-11-02 NOTE — DISCHARGE INSTRUCTIONS
HOME CARE FOLLOWING ABDOMINAL SURGERY  LAVELL Stewart, PORTER Rowley C. Pratt, J. Shaheen  RETURN APPOINTMENT:  Schedule a follow-up visit 1-2 weeks after discharge from the hospital.  Office Phone:  [phone]    INCISIONAL CARE:  Staples in your incision will be removed at your follow-up appointment.     BATHING:  Avoid baths for 1 week after surgery.  Showers are okay.  You may wash your hair at any time.  Gently pat your incision dry after bathing.    ACTIVITY:  Light Activity -- you may immediately be up and about as tolerated.  Driving -- you may drive when comfortable and off narcotic pain medications.  Light Work -- resume when comfortable off pain medications.  (If you can drive, you probably can work.)  Strenuous Work/Activity -- limit lifting to 20 pounds for 4 weeks.  Then, progressively increase with time.  Active Sports (running, biking, etc.) -- cautiously resume after 6 weeks.    DISCOMFORT:  Use pain medications as prescribed by your surgeon.  Take the pain medication with some food, when possible, to minimize side effects.  Expect gradual improvement.    DIET:  Return to diet you were on before surgery, unless you are given specific diet instructions.  Drink plenty of fluids.  While taking pain medications, increase dietary fiber or add a fiber supplementation like Metamucil or Citrucel to help prevent constipation - a possible side effect of pain medications.    NAUSEA:  If nauseated from the anesthetic/pain meds; rest in bed, get up cautiously with assistance, and drink clear liquids (juice, tea, broth).     CONTACT US IF THE FOLLOWING DEVELOPS:   1. A fever that is above 101     2. If there is a large amount of drainage, bleeding, or swelling.   3. Severe pain that is not relieved by your prescription.   4. Drainage that is thick, cloudy, yellow, green or white.

## 2024-11-02 NOTE — PROGRESS NOTES
"[facility]   General Surgery Progress Note          Assessment and Plan:   Assessment:   10/25  s/p exploratory laparotomy and extensive lysis of adhesions for SBO  Postoperative ileus as expected -- resolved  Gastrografin challenge 10/31, contrast reached colon      Plan:   -discharge today. Declines pain med rx.  -follow up in 1-2 weeks for staple removal         Interval History:   Comfortable, postop pain manageable without narcotics. Tolerating diet and having diarrhea. Up walking ok and feels ready for home.         Physical Exam:   Blood pressure 112/63, pulse 68, temperature 98.3  F (36.8  C), temperature source Oral, resp. rate 20, height 1.6 m (5' 3\"), weight 52.2 kg (115 lb 1.6 oz), SpO2 98%.    No intake/output data recorded.    Abdomen: soft, +mildly distended, NT  Inc(s) - clean, dry, intact with staples, no erythema, no drainage          Data:     Recent Labs   Lab 11/01/24  0618 10/30/24  0654 10/29/24  0654 10/28/24  0714 10/27/24  0748     --   --   --  141   POTASSIUM 3.8 4.2 4.0   < > 4.5   CHLORIDE 108*  --   --   --  108*   CO2 24  --   --   --  25   ANIONGAP 11  --   --   --  8   *  --   --   --  104*   BUN 8.2  --   --   --  10.5   CR 0.94  --   --   --  0.79   GFRESTIMATED 64  --   --   --  79   JOSE ANTONIO 8.5*  --   --   --  8.7*    < > = values in this interval not displayed.     Recent Labs   Lab 11/01/24 0618   WBC 6.2   HGB 11.8   HCT 37.3   MCV 97             Deanne Merritt PA-C    "

## 2024-11-02 NOTE — PLAN OF CARE
"Goal Outcome Evaluation:      Plan of Care Reviewed With: patient    Overall Patient Progress: improvingOverall Patient Progress: improving    Outcome Evaluation: Pt alert and orientated. on RA. denies pain. midline abd incision CDI- open to air. Active bowel sounds heard. Pt denied Senna and Miralax due to loose stools. up ind in room. PIV SL. Possible discharge today    Temp: 98  F (36.7  C) Temp src: Oral BP: 92/56 Pulse: 68   Resp: 16 SpO2: 96 % O2 Device: None (Room air)         Problem: Adult Inpatient Plan of Care  Goal: Plan of Care Review  Description: The Plan of Care Review/Shift note should be completed every shift.  The Outcome Evaluation is a brief statement about your assessment that the patient is improving, declining, or no change.  This information will be displayed automatically on your shift  note.  Outcome: Progressing  Flowsheets (Taken 11/2/2024 0457)  Outcome Evaluation: Pt alert and orientated. on RA. denies pain. midline abd incision CDI- open to air. Active bowel sounds heard. Pt denied Senna and Miralax due to loose stools. up ind in room. PIV SL. Possible discharge today  Plan of Care Reviewed With: patient  Overall Patient Progress: improving  Goal: Patient-Specific Goal (Individualized)  Description: You can add care plan individualizations to a care plan. Examples of Individualization might be:  \"Parent requests to be called daily at 9am for status\", \"I have a hard time hearing out of my right ear\", or \"Do not touch me to wake me up as it startles  me\".  Outcome: Progressing  Goal: Absence of Hospital-Acquired Illness or Injury  Outcome: Progressing  Intervention: Identify and Manage Fall Risk  Recent Flowsheet Documentation  Taken 11/1/2024 2104 by Bozena Moore, RN  Safety Promotion/Fall Prevention:   safety round/check completed   nonskid shoes/slippers when out of bed  Intervention: Prevent Skin Injury  Recent Flowsheet Documentation  Taken 11/1/2024 2104 by Bozena Moore, " RN  Body Position: position changed independently  Intervention: Prevent and Manage VTE (Venous Thromboembolism) Risk  Recent Flowsheet Documentation  Taken 11/1/2024 2104 by Bozena Moore RN  VTE Prevention/Management: SCDs off (sequential compression devices)  Intervention: Prevent Infection  Recent Flowsheet Documentation  Taken 11/1/2024 2104 by Bozena Moore RN  Infection Prevention:   rest/sleep promoted   hand hygiene promoted  Goal: Optimal Comfort and Wellbeing  Outcome: Progressing  Goal: Readiness for Transition of Care  Outcome: Progressing     Problem: Intestinal Obstruction  Goal: Optimal Bowel Function  Outcome: Progressing  Intervention: Promote Bowel Function  Recent Flowsheet Documentation  Taken 11/1/2024 2104 by Bozena Moore RN  Body Position: position changed independently  Head of Bed (HOB) Positioning: HOB at 20-30 degrees  Positioning/Transfer Devices:   pillows   in use  Goal: Fluid and Electrolyte Balance  Outcome: Progressing  Goal: Absence of Infection Signs and Symptoms  Outcome: Progressing  Goal: Optimize Nutrition Status  Outcome: Progressing  Goal: Optimal Pain Control and Function  Outcome: Progressing     Problem: Pain Acute  Goal: Optimal Pain Control and Function  Intervention: Prevent or Manage Pain  Recent Flowsheet Documentation  Taken 11/1/2024 2104 by Bozena Moore RN  Medication Review/Management: medications reviewed

## 2024-11-08 ENCOUNTER — OFFICE VISIT (OUTPATIENT)
Dept: SURGERY | Facility: CLINIC | Age: 74
End: 2024-11-08
Payer: COMMERCIAL

## 2024-11-08 VITALS
HEIGHT: 63 IN | DIASTOLIC BLOOD PRESSURE: 68 MMHG | SYSTOLIC BLOOD PRESSURE: 116 MMHG | WEIGHT: 115 LBS | HEART RATE: 74 BPM | BODY MASS INDEX: 20.38 KG/M2 | OXYGEN SATURATION: 98 %

## 2024-11-08 DIAGNOSIS — L03.311 CELLULITIS OF ABDOMINAL WALL: Primary | ICD-10-CM

## 2024-11-08 PROCEDURE — 99024 POSTOP FOLLOW-UP VISIT: CPT

## 2024-11-08 RX ORDER — CEPHALEXIN 500 MG/1
500 CAPSULE ORAL 3 TIMES DAILY
Qty: 21 CAPSULE | Refills: 0 | Status: SHIPPED | OUTPATIENT
Start: 2024-11-08 | End: 2024-11-15

## 2024-11-08 NOTE — PROGRESS NOTES
11/8/2024    [facility] Note     Subjective:  Maria Esther Miller is here for staple removal. She underwent exploratory laparotomy and extensive lysis of adhesions for SBO by Dr. Fofana on 10/25. Today she  tells me she has been feeling well since discharge from the hospital. She currently does not require narcotic pain medications, she is eating a normal diet and her bowels are alternating constipation and regularity. She uses Miralax as needed. She has no concerns today. She is looking forward to resuming her exercise regimen.    Objective:  Abd - Abdomen soft, non-tender. BS normal. No masses, organomegaly  Inc - Healing well, well approximated. + erythema ~6cm diameter, marked with pen. Staples removed and full-length steri strips placed with overlay gauze dressing.    Assessment:  S/p exploratory laparotomy and extensive lysis of adhesions for SBO.     Plan:  Rx Keflex  Return if redness worsens or fails to resolve within 1 week.  Follow up with Dr. Ct PITTMAN.      Deanne Merritt PA-C      Please route or send letter to:  Primary Care Provider (PCP)

## 2025-02-23 ENCOUNTER — HEALTH MAINTENANCE LETTER (OUTPATIENT)
Age: 75
End: 2025-02-23

## (undated) DEVICE — DRAPE IOBAN INCISE 23X17" 6650EZ

## (undated) DEVICE — Device

## (undated) DEVICE — CATH TRAY FOLEY SURESTEP 16FR DRAIN BAG STATOCK A899916

## (undated) DEVICE — SUCTION MANIFOLD NEPTUNE 2 SYS 4 PORT 0702-020-000

## (undated) DEVICE — BNDG ABDOMINAL BINDER 9X30-45" 79-89070

## (undated) DEVICE — ESU LIGASURE IMPACT OPEN SEALER/DVDR CVD LG JAW LF4418

## (undated) DEVICE — SU PDS II 0 CTX 60" Z990G

## (undated) DEVICE — PACK MAJOR SBA15MAFSI

## (undated) DEVICE — LINEN FULL SHEET 5511

## (undated) DEVICE — SU VICRYL 3-0 SH 27" J316H

## (undated) DEVICE — BARRIER SEPRAFILM 5X6" SINGLE SHEET 4301-02

## (undated) DEVICE — GLOVE BIOGEL PI SZ 8.0 40880

## (undated) DEVICE — DRSG GAUZE 4X4" TRAY

## (undated) DEVICE — LINEN HALF SHEET 5512

## (undated) DEVICE — ESU GROUND PAD ADULT W/CORD E7507

## (undated) DEVICE — BAG CLEAR TRASH 1.3M 39X33" P4040C

## (undated) DEVICE — LINEN TOWEL PACK X10 5473

## (undated) DEVICE — PREP CHLORAPREP 26ML TINTED HI-LITE ORANGE 930815

## (undated) DEVICE — DRAPE LAP W/ARMBOARD 29410

## (undated) DEVICE — SUCTION TIP POOLE K770

## (undated) DEVICE — GLOVE BIOGEL PI MICRO SZ 7.5 48575

## (undated) DEVICE — SU VICRYL 3-0 SH CR 8X18" J774

## (undated) DEVICE — SPONGE LAP 18X18" X8435

## (undated) DEVICE — STPL SKIN 35W 6.9MM  PXW35

## (undated) DEVICE — GOWN IMPERVIOUS ZONED XLG 9041

## (undated) RX ORDER — ONDANSETRON 2 MG/ML
INJECTION INTRAMUSCULAR; INTRAVENOUS
Status: DISPENSED
Start: 2024-10-25

## (undated) RX ORDER — FENTANYL CITRATE-0.9 % NACL/PF 10 MCG/ML
PLASTIC BAG, INJECTION (ML) INTRAVENOUS
Status: DISPENSED
Start: 2024-10-25

## (undated) RX ORDER — FENTANYL CITRATE 50 UG/ML
INJECTION, SOLUTION INTRAMUSCULAR; INTRAVENOUS
Status: DISPENSED
Start: 2024-10-25

## (undated) RX ORDER — PROPOFOL 10 MG/ML
INJECTION, EMULSION INTRAVENOUS
Status: DISPENSED
Start: 2024-10-25

## (undated) RX ORDER — DEXAMETHASONE SODIUM PHOSPHATE 4 MG/ML
INJECTION, SOLUTION INTRA-ARTICULAR; INTRALESIONAL; INTRAMUSCULAR; INTRAVENOUS; SOFT TISSUE
Status: DISPENSED
Start: 2024-10-25

## (undated) RX ORDER — EPHEDRINE SULFATE 50 MG/ML
INJECTION, SOLUTION INTRAMUSCULAR; INTRAVENOUS; SUBCUTANEOUS
Status: DISPENSED
Start: 2024-10-25

## (undated) RX ORDER — KETOROLAC TROMETHAMINE 30 MG/ML
INJECTION, SOLUTION INTRAMUSCULAR; INTRAVENOUS
Status: DISPENSED
Start: 2024-10-25

## (undated) RX ORDER — GLYCOPYRROLATE 0.2 MG/ML
INJECTION, SOLUTION INTRAMUSCULAR; INTRAVENOUS
Status: DISPENSED
Start: 2024-10-25

## (undated) RX ORDER — CEFAZOLIN SODIUM/WATER 2 G/20 ML
SYRINGE (ML) INTRAVENOUS
Status: DISPENSED
Start: 2024-10-25

## (undated) RX ORDER — BUPIVACAINE HYDROCHLORIDE AND EPINEPHRINE 5; 5 MG/ML; UG/ML
INJECTION, SOLUTION EPIDURAL; INTRACAUDAL; PERINEURAL
Status: DISPENSED
Start: 2024-10-25

## (undated) RX ORDER — HYDROMORPHONE HCL IN WATER/PF 6 MG/30 ML
PATIENT CONTROLLED ANALGESIA SYRINGE INTRAVENOUS
Status: DISPENSED
Start: 2024-10-25